# Patient Record
Sex: FEMALE | Race: BLACK OR AFRICAN AMERICAN | NOT HISPANIC OR LATINO | Employment: FULL TIME | URBAN - METROPOLITAN AREA
[De-identification: names, ages, dates, MRNs, and addresses within clinical notes are randomized per-mention and may not be internally consistent; named-entity substitution may affect disease eponyms.]

---

## 2022-07-01 ENCOUNTER — TELEPHONE (OUTPATIENT)
Dept: INTERNAL MEDICINE | Facility: CLINIC | Age: 52
End: 2022-07-01

## 2022-07-01 NOTE — TELEPHONE ENCOUNTER
Pt is complaining of sweating profusely all day, pt states it started a month ago and at first it was just at night but now its all day. Pt would like to schedule an appt to see eladia Guillermo.

## 2024-08-19 ENCOUNTER — TELEPHONE (OUTPATIENT)
Dept: GYNECOLOGY | Facility: CLINIC | Age: 54
End: 2024-08-19
Payer: COMMERCIAL

## 2024-08-19 NOTE — TELEPHONE ENCOUNTER
----- Message from Eugenia Almonte sent at 8/13/2024  2:02 PM CDT -----  Good afternoon Ms. Mondragon,    I put a note on the appointment (08/22/24) tab that patient needs cardiac risk stratification for procedure.  Will notify you when it is completed.    Thank you,  Alanna Alvarado LPN Ragas, Amanda; Eugenia Almonte  Caller: Unspecified (5 days ago,  2:46 PM)  She has an appt on 8/22/24.  Would that visit be able to provide clearance for GYN?        Previous Messages       ----- Message -----  From: Alanna Bell LPN  Sent: 8/13/2024   8:00 AM CDT  To: Alanna Bell LPN      ----- Message -----  From: Alanna Bell LPN  Sent: 8/8/2024   2:48 PM CDT  To: Alanna Bell LPN; Eugenia Almonte   Patient Calls  (Newest Message First)  View All Conversations on this Encounter   Alanna Bell LPN  You; Eugenia Almonte1 hour ago (11:34 AM)    ADALBERTO  She has an appt on 8/22/24.  Would that visit be able to provide clearance for GYN?     Alanna Bell LPN routed conversation to Alanna Bell LPN5 hours ago (8:00 AM)     Alanna Bell LPN routed conversation to Alanna Bell LPN5 hours ago (8:00 AM)    Alanna Bell LPN routed conversation to You; Alanna Bell LPN5 days ago    Alanna Bell LPN5 days ago    ADALBERTO Montilla    GYN requested a clearance some time back, but tests needed to be done.  I was just wanting to F/U to see if a clearance would be a possibility at this time.     Alanna Boyer

## 2024-08-20 ENCOUNTER — LAB VISIT (OUTPATIENT)
Dept: LAB | Facility: HOSPITAL | Age: 54
End: 2024-08-20
Attending: NURSE PRACTITIONER
Payer: COMMERCIAL

## 2024-08-20 DIAGNOSIS — E55.9 VITAMIN D DEFICIENCY: ICD-10-CM

## 2024-08-20 DIAGNOSIS — I10 PRIMARY HYPERTENSION: ICD-10-CM

## 2024-08-20 DIAGNOSIS — R73.03 PREDIABETES: ICD-10-CM

## 2024-08-20 LAB
25(OH)D3+25(OH)D2 SERPL-MCNC: 49 NG/ML (ref 30–80)
ALBUMIN SERPL-MCNC: 3.4 G/DL (ref 3.5–5)
ALBUMIN/GLOB SERPL: 0.8 RATIO (ref 1.1–2)
ALP SERPL-CCNC: 106 UNIT/L (ref 40–150)
ALT SERPL-CCNC: 13 UNIT/L (ref 0–55)
ANION GAP SERPL CALC-SCNC: 7 MEQ/L
AST SERPL-CCNC: 14 UNIT/L (ref 5–34)
BACTERIA #/AREA URNS AUTO: ABNORMAL /HPF
BASOPHILS # BLD AUTO: 0.03 X10(3)/MCL
BASOPHILS NFR BLD AUTO: 0.4 %
BILIRUB SERPL-MCNC: 0.3 MG/DL
BILIRUB UR QL STRIP.AUTO: NEGATIVE
BUN SERPL-MCNC: 14.1 MG/DL (ref 9.8–20.1)
CALCIUM SERPL-MCNC: 9.9 MG/DL (ref 8.4–10.2)
CHLORIDE SERPL-SCNC: 106 MMOL/L (ref 98–107)
CHOLEST SERPL-MCNC: 175 MG/DL
CHOLEST/HDLC SERPL: 4 {RATIO} (ref 0–5)
CLARITY UR: CLEAR
CO2 SERPL-SCNC: 28 MMOL/L (ref 22–29)
COLOR UR AUTO: YELLOW
CREAT SERPL-MCNC: 0.73 MG/DL (ref 0.55–1.02)
CREAT/UREA NIT SERPL: 19
EOSINOPHIL # BLD AUTO: 0.18 X10(3)/MCL (ref 0–0.9)
EOSINOPHIL NFR BLD AUTO: 2.6 %
ERYTHROCYTE [DISTWIDTH] IN BLOOD BY AUTOMATED COUNT: 15.9 % (ref 11.5–17)
EST. AVERAGE GLUCOSE BLD GHB EST-MCNC: 125.5 MG/DL
GFR SERPLBLD CREATININE-BSD FMLA CKD-EPI: >60 ML/MIN/1.73/M2
GLOBULIN SER-MCNC: 4.2 GM/DL (ref 2.4–3.5)
GLUCOSE SERPL-MCNC: 99 MG/DL (ref 74–100)
GLUCOSE UR QL STRIP: NORMAL
HBA1C MFR BLD: 6 %
HCT VFR BLD AUTO: 40.2 % (ref 37–47)
HDLC SERPL-MCNC: 46 MG/DL (ref 35–60)
HGB BLD-MCNC: 12.3 G/DL (ref 12–16)
HGB UR QL STRIP: ABNORMAL
HYALINE CASTS #/AREA URNS LPF: ABNORMAL /LPF
IMM GRANULOCYTES # BLD AUTO: 0.02 X10(3)/MCL (ref 0–0.04)
IMM GRANULOCYTES NFR BLD AUTO: 0.3 %
KETONES UR QL STRIP: NEGATIVE
LDLC SERPL CALC-MCNC: 111 MG/DL (ref 50–140)
LEUKOCYTE ESTERASE UR QL STRIP: NEGATIVE
LYMPHOCYTES # BLD AUTO: 2.41 X10(3)/MCL (ref 0.6–4.6)
LYMPHOCYTES NFR BLD AUTO: 34.2 %
MCH RBC QN AUTO: 22.8 PG (ref 27–31)
MCHC RBC AUTO-ENTMCNC: 30.6 G/DL (ref 33–36)
MCV RBC AUTO: 74.4 FL (ref 80–94)
MONOCYTES # BLD AUTO: 0.59 X10(3)/MCL (ref 0.1–1.3)
MONOCYTES NFR BLD AUTO: 8.4 %
MUCOUS THREADS URNS QL MICRO: ABNORMAL /LPF
NEUTROPHILS # BLD AUTO: 3.82 X10(3)/MCL (ref 2.1–9.2)
NEUTROPHILS NFR BLD AUTO: 54.1 %
NITRITE UR QL STRIP: NEGATIVE
NRBC BLD AUTO-RTO: 0 %
PH UR STRIP: 5.5 [PH]
PLATELET # BLD AUTO: 246 X10(3)/MCL (ref 130–400)
PMV BLD AUTO: 12.6 FL (ref 7.4–10.4)
POTASSIUM SERPL-SCNC: 3.7 MMOL/L (ref 3.5–5.1)
PROT SERPL-MCNC: 7.6 GM/DL (ref 6.4–8.3)
PROT UR QL STRIP: ABNORMAL
RBC # BLD AUTO: 5.4 X10(6)/MCL (ref 4.2–5.4)
RBC #/AREA URNS AUTO: ABNORMAL /HPF
SODIUM SERPL-SCNC: 141 MMOL/L (ref 136–145)
SP GR UR STRIP.AUTO: 1.03 (ref 1–1.03)
SQUAMOUS #/AREA URNS LPF: ABNORMAL /HPF
TRIGL SERPL-MCNC: 91 MG/DL (ref 37–140)
UROBILINOGEN UR STRIP-ACNC: ABNORMAL
VLDLC SERPL CALC-MCNC: 18 MG/DL
WBC # BLD AUTO: 7.05 X10(3)/MCL (ref 4.5–11.5)
WBC #/AREA URNS AUTO: ABNORMAL /HPF

## 2024-08-20 PROCEDURE — 80053 COMPREHEN METABOLIC PANEL: CPT

## 2024-08-20 PROCEDURE — 81015 MICROSCOPIC EXAM OF URINE: CPT

## 2024-08-20 PROCEDURE — 82306 VITAMIN D 25 HYDROXY: CPT

## 2024-08-20 PROCEDURE — 80061 LIPID PANEL: CPT

## 2024-08-20 PROCEDURE — 36415 COLL VENOUS BLD VENIPUNCTURE: CPT

## 2024-08-20 PROCEDURE — 85025 COMPLETE CBC W/AUTO DIFF WBC: CPT

## 2024-08-20 PROCEDURE — 83036 HEMOGLOBIN GLYCOSYLATED A1C: CPT

## 2024-08-20 PROCEDURE — 81001 URINALYSIS AUTO W/SCOPE: CPT

## 2024-08-21 ENCOUNTER — OFFICE VISIT (OUTPATIENT)
Dept: INTERNAL MEDICINE | Facility: CLINIC | Age: 54
End: 2024-08-21
Payer: COMMERCIAL

## 2024-08-21 VITALS
HEIGHT: 64 IN | SYSTOLIC BLOOD PRESSURE: 117 MMHG | WEIGHT: 274 LBS | RESPIRATION RATE: 16 BRPM | BODY MASS INDEX: 46.78 KG/M2 | HEART RATE: 93 BPM | DIASTOLIC BLOOD PRESSURE: 66 MMHG | TEMPERATURE: 98 F

## 2024-08-21 DIAGNOSIS — R73.03 PREDIABETES: ICD-10-CM

## 2024-08-21 DIAGNOSIS — R94.2 RESTRICTIVE PATTERN PRESENT ON PULMONARY FUNCTION TESTING: ICD-10-CM

## 2024-08-21 DIAGNOSIS — I10 PRIMARY HYPERTENSION: ICD-10-CM

## 2024-08-21 DIAGNOSIS — E66.01 OBESITY, CLASS III, BMI 40-49.9 (MORBID OBESITY): ICD-10-CM

## 2024-08-21 DIAGNOSIS — R06.02 SOB (SHORTNESS OF BREATH): ICD-10-CM

## 2024-08-21 PROCEDURE — 99215 OFFICE O/P EST HI 40 MIN: CPT | Mod: PBBFAC | Performed by: NURSE PRACTITIONER

## 2024-08-21 PROCEDURE — 99214 OFFICE O/P EST MOD 30 MIN: CPT | Mod: S$PBB,,, | Performed by: NURSE PRACTITIONER

## 2024-08-21 RX ORDER — MECLIZINE HYDROCHLORIDE 25 MG/1
25 TABLET ORAL 3 TIMES DAILY PRN
Qty: 30 TABLET | Refills: 4 | Status: SHIPPED | OUTPATIENT
Start: 2024-08-21

## 2024-08-21 RX ORDER — SEMAGLUTIDE 0.25 MG/.5ML
0.25 INJECTION, SOLUTION SUBCUTANEOUS
Qty: 2 ML | Refills: 3 | Status: SHIPPED | OUTPATIENT
Start: 2024-08-21

## 2024-08-21 RX ORDER — VALACYCLOVIR HYDROCHLORIDE 500 MG/1
500 TABLET, FILM COATED ORAL DAILY
Qty: 90 TABLET | Refills: 1 | Status: SHIPPED | OUTPATIENT
Start: 2024-08-21

## 2024-08-21 RX ORDER — OMEPRAZOLE 40 MG/1
40 CAPSULE, DELAYED RELEASE ORAL DAILY
Qty: 90 CAPSULE | Refills: 1 | Status: SHIPPED | OUTPATIENT
Start: 2024-08-21

## 2024-08-21 RX ORDER — CLONIDINE HYDROCHLORIDE 0.2 MG/1
0.2 TABLET ORAL NIGHTLY
Qty: 30 TABLET | Refills: 6 | Status: SHIPPED | OUTPATIENT
Start: 2024-08-21 | End: 2025-08-21

## 2024-08-21 RX ORDER — ALBUTEROL SULFATE 90 UG/1
2 INHALANT RESPIRATORY (INHALATION) EVERY 6 HOURS PRN
Qty: 8 G | Refills: 1 | Status: SHIPPED | OUTPATIENT
Start: 2024-08-21

## 2024-08-21 RX ORDER — IPRATROPIUM BROMIDE AND ALBUTEROL SULFATE 2.5; .5 MG/3ML; MG/3ML
3 SOLUTION RESPIRATORY (INHALATION) EVERY 6 HOURS PRN
Qty: 75 ML | Refills: 2 | Status: SHIPPED | OUTPATIENT
Start: 2024-08-21 | End: 2025-08-21

## 2024-08-21 NOTE — PROGRESS NOTES
[facility]  MARY Cunha     Patient Name: Saeid Cheema   : 1970  MRN:[id number]     Chief Complaint     Chief Complaint   Patient presents with    Hypertension     Lab review        History of Present Illness     53 year old AAF, presents in clinic for lab results. No acute complaints. Requests Wegovy injections for weight loss. BMI 47. A1C 6.0, prediabetic.      PMH HTN, HLD, prediabetes, CAD, HFrEF, left breast cancer 10/7/2022, left lower lobe lung nodule, GERD, migraines, genital herpes, IC, hematuria, chlamydia pneumonia 3/2022, BV. Nonsmoker. PMH TIA, and small cerebral aneurysm in . 2021; MR angio head and MRI brain without acute findings. EEG 2021; normal.   Following Dr. Shankar [facility]O for migraines. He is trying to get her approved for BOTOX injections for migraine prevention. Followed by [facility] and [facility] for further workup of hematuria; cystoscopy 2020 displayed Microscopic hematuria, Squamous metaplasia. Status post bilateral mastectomy 2022 [facility] with the large open wound in the right chest. Seroma in the region of the left lateral chest wall. Wound are at [facility] per Dr. Rowley. [facility] G[facility] and [facility].   She is on valacyclovir 500mg daily for genital herpes-suppression; voices 3 outbreaks per yr, in which she doubles her med for 5 days during an outbreak.   PFT 8/10/2022 moderate restriction, no bronchodilator response. Normal lung volumes. Although she is requesting refill on albuterol as she states she feels it helps her breathing.  PFT 10/26/23 WNL       States her  passed away of cardiac complications 2022. Lives with 15 year old grandson.   Denies CP, SOB, cough, fever, dizziness, weakness, abdominal pain, nausea, vomiting, diarrhea, constipation, depression, SI,  "HI.  ----------------------------------------------------------------------------------------------------------------------------------------------------------------------------------   Works at [employer]  Colonoscopy with polyps, Dr. Richardson 2021; told she needed to repeat scope but states she could not afford the repeat scope.  CT Chest 10/3/2023 Stable 5 mm left lower lobe nodule and 3 mm right middle lobe fissural nodule.No new or enlarging pulmonary nodule.  Recommend continued follow-up low-dose chest CT in 1 year.  She has FMLA intermittent leave paperwork for h/o migraines.                      Review of Systems     Review of Systems   Constitutional: Negative.    HENT: Negative.     Eyes: Negative.    Respiratory:  Positive for shortness of breath.         DUARTE   Cardiovascular: Negative.    Gastrointestinal: Negative.    Endocrine: Negative.    Genitourinary: Negative.    Musculoskeletal: Negative.    Integumentary:  Negative.   Allergic/Immunologic: Negative.    Neurological: Negative.    Hematological: Negative.    Psychiatric/Behavioral: Negative.     All other systems reviewed and are negative.       Physical Examination     Visit Vitals  /66 (BP Location: Left arm, Patient Position: Sitting, BP Method: Large (Automatic))   Pulse 93   Temp 98.1 °F (36.7 °C) (Oral)   Resp 16   Ht 5' 4" (1.626 m)   Wt 124.3 kg (274 lb)   BMI 47.03 kg/m²        BP Readings from Last 6 Encounters:   08/21/24 117/66   07/29/24 (!) 150/81   07/09/24 119/71   06/26/24 123/82   06/04/24 (!) 142/67   05/15/24 (!) 157/95   ]    Wt Readings from Last 6 Encounters:   08/21/24 124.3 kg (274 lb)   07/29/24 120.2 kg (265 lb)   07/09/24 121.3 kg (267 lb 6.4 oz)   06/26/24 118.8 kg (261 lb 14.5 oz)   06/04/24 118.8 kg (262 lb)   05/15/24 119.7 kg (264 lb)   ]    BMI Readings from Last 3 Encounters:   08/21/24 47.03 kg/m²   07/29/24 45.49 kg/m²   07/09/24 45.90 kg/m²         Physical Exam  Vitals and nursing note " reviewed.   Constitutional:       Appearance: Normal appearance. She is obese.   HENT:      Head: Normocephalic and atraumatic.      Right Ear: Tympanic membrane, ear canal and external ear normal.      Left Ear: Tympanic membrane, ear canal and external ear normal.      Nose: Nose normal.      Mouth/Throat:      Mouth: Mucous membranes are moist.      Pharynx: Oropharynx is clear.   Eyes:      Extraocular Movements: Extraocular movements intact.      Conjunctiva/sclera: Conjunctivae normal.      Pupils: Pupils are equal, round, and reactive to light.   Cardiovascular:      Rate and Rhythm: Normal rate and regular rhythm.      Pulses: Normal pulses.      Heart sounds: Normal heart sounds.   Pulmonary:      Effort: Pulmonary effort is normal.      Breath sounds: Normal breath sounds.   Abdominal:      General: Abdomen is flat. Bowel sounds are normal.      Palpations: Abdomen is soft.   Musculoskeletal:         General: Normal range of motion.      Cervical back: Normal range of motion and neck supple.   Skin:     General: Skin is warm and dry.      Capillary Refill: Capillary refill takes less than 2 seconds.   Neurological:      General: No focal deficit present.      Mental Status: She is alert and oriented to person, place, and time. Mental status is at baseline.   Psychiatric:         Mood and Affect: Mood normal.         Behavior: Behavior normal.         Thought Content: Thought content normal.         Judgment: Judgment normal.          Labs / Imaging     Chemistry:  Lab Results   Component Value Date     08/20/2024     11/23/2022    K 3.7 08/20/2024    K 3.8 11/23/2022    BUN 14.1 08/20/2024    BUN 10 11/23/2022    CREATININE 0.73 08/20/2024    CREATININE 0.65 11/23/2022    EGFRNORACEVR >60 08/20/2024    GLUCOSE 99 08/20/2024    CALCIUM 9.9 08/20/2024    CALCIUM 9.5 11/23/2022    ALKPHOS 106 08/20/2024    LABPROT 7.6 08/20/2024    ALBUMIN 3.4 (L) 08/20/2024    BILIDIR 0.1 07/13/2022    IBILI 0.10  01/25/2022    AST 14 08/20/2024    ALT 13 08/20/2024    MG 1.90 06/04/2024    FVCPYVML54OI 49 08/20/2024        Lab Results   Component Value Date    HGBA1C 6.0 08/20/2024        Hematology:  Lab Results   Component Value Date    WBC 7.05 08/20/2024    RBC 5.40 08/20/2024    HGB 12.3 08/20/2024    HCT 40.2 08/20/2024    MCV 74.4 (L) 08/20/2024    MCH 22.8 (L) 08/20/2024    MCHC 30.6 (L) 08/20/2024    RDW 15.9 08/20/2024     08/20/2024    MPV 12.6 (H) 08/20/2024        Lipid Panel:  Lab Results   Component Value Date    CHOL 175 08/20/2024    HDL 46 08/20/2024    .00 08/20/2024    TRIG 91 08/20/2024    TOTALCHOLEST 4 08/20/2024        Urine:  Lab Results   Component Value Date    APPEARANCEUA Clear 08/20/2024    SGUA 1.029 08/20/2024    PROTEINUA Trace (A) 08/20/2024    KETONESUA Negative 08/20/2024    LEUKOCYTESUR Negative 08/20/2024    RBCUA 0-5 08/20/2024    WBCUA 0-5 08/20/2024    BACTERIA Trace (A) 08/20/2024    SQEPUA Moderate (A) 08/20/2024    HYALINECASTS 0-2 (A) 08/20/2024    CREATRANDUR 222.0 02/19/2018          Assessment       ICD-10-CM ICD-9-CM   1. Primary hypertension  I10 401.9   2. Restrictive pattern present on pulmonary function testing  R94.2 794.2   3. SOB (shortness of breath)  R06.02 786.05   4. Prediabetes  R73.03 790.29   5. Obesity, Class III, BMI 40-49.9 (morbid obesity)  E66.01 278.01        Plan   1. Primary hypertension  BP and HR stable. Med refills. DASH diet: Eat more fruits, vegetables, and low fat dairy foods.  (Less than 2 grams of sodium per day).  Maintain healthy weight with goal BMI <30.   Exercise 30 minutes per day 5 days per week.  Home medications refilled and continued.   Home BP monitoring encouraged with BP parameters given.      2. Restrictive pattern present on pulmonary function testing    - albuterol-ipratropium (DUO-NEB) 2.5 mg-0.5 mg/3 mL nebulizer solution; Take 3 mLs by nebulization every 6 (six) hours as needed for Wheezing. Rescue  Dispense: 75  mL; Refill: 2  - CT Chest Without Contrast; Future    3. SOB (shortness of breath)    - albuterol (PROVENTIL/VENTOLIN HFA) 90 mcg/actuation inhaler; Inhale 2 puffs into the lungs every 6 (six) hours as needed for Wheezing. Rescue  Dispense: 8 g; Refill: 1  - CT Chest Without Contrast; Future    4. Prediabetes  A1C 6.0  Prediabetes is reversible. Close follow up is important.  Maintain healthy weight or lose weight.   Eat fewer refined carbohydrates and fats, and more fiber.  Read nutrition labels.  Reduce portion sizes.  Eat out less often. Avoid fast foods.  Drink water and unsweetened beverages.  Spending at least one hour every day in physical activity.   - semaglutide, weight loss, (WEGOVY) 0.25 mg/0.5 mL PnIj; Inject 0.25 mg into the skin every 7 days.  Dispense: 2 mL; Refill: 3  - Comprehensive Metabolic Panel; Future  - Hemoglobin A1C; Future    5. Obesity, Class III, BMI 40-49.9 (morbid obesity)  RX Wegovy 0.25mg once weekly,   F/u 4 -6 wks  Goal BMI <30.  Exercise 5 times a week for 30 minutes per day.  Avoid soda, simple sugars, excessive rice, potatoes or bread. Limit fast foods and fried foods.  Choose complex carbs in moderation (example: green vegetables, beans, oatmeal). Eat plenty of fresh fruits and vegetables with lean meats daily.  Do not skip meals. Eat a balanced portion size.  Avoid fad diets. Consider permanent healthy life style changes.     - semaglutide, weight loss, (WEGOVY) 0.25 mg/0.5 mL PnIj; Inject 0.25 mg into the skin every 7 days.  Dispense: 2 mL; Refill: 3  - Comprehensive Metabolic Panel; Future  - Hemoglobin A1C; Future        Current Outpatient Medications   Medication Instructions    albuterol (PROVENTIL/VENTOLIN HFA) 90 mcg/actuation inhaler 2 puffs, Inhalation, Every 6 hours PRN, Rescue    albuterol-ipratropium (DUO-NEB) 2.5 mg-0.5 mg/3 mL nebulizer solution 3 mLs, Nebulization, Every 6 hours PRN, Rescue    ascorbic acid (vitamin C) (VITAMIN C) 1,000 mg, Oral, Daily     cholecalciferol (vitamin D3) 50,000 Units, Oral, Every 7 days    cloNIDine (CATAPRES) 0.2 mg, Oral, Nightly    furosemide (LASIX) 40 mg, Oral, Daily    meclizine (ANTIVERT) 25 mg, Oral, 3 times daily PRN    metoprolol succinate (TOPROL-XL) 25 mg, Oral, Daily    nitroGLYCERIN (NITROSTAT) 0.4 mg, Sublingual, Every 5 min PRN    omeprazole (PRILOSEC) 40 mg, Oral, Daily    ondansetron (ZOFRAN) 8 mg, Oral, Every 12 hours PRN    QULIPTA 60 mg, Oral, Daily    sacubitriL-valsartan (ENTRESTO) 49-51 mg per tablet 1 tablet, Oral, 2 times daily    valACYclovir (VALTREX) 500 mg, Oral, Daily    WEGOVY 0.25 mg, Subcutaneous, Every 7 days       Orders Placed This Encounter   Procedures    CT Chest Without Contrast    Comprehensive Metabolic Panel    Hemoglobin A1C         Future Appointments   Date Time Provider Department Center   8/22/2024  2:00 PM [facility]    8/28/2024  8:30 AM [facility]    10/21/2024  1:40 PM [facility]    10/21/2024  3:00 PM [facility]         Follow up in about 2 months (around 10/21/2024) for lab review.    Labs thoroughly reviewed with patient. Medication refills addressed today.  RTC prn and 2 months, with labs 1 week prior to the apt.  COVID 19 precautions given to patient.  Patient voices understanding of all discharge instructions.      MARY Cunha

## 2024-08-28 ENCOUNTER — LAB VISIT (OUTPATIENT)
Dept: LAB | Facility: HOSPITAL | Age: 54
End: 2024-08-28
Attending: NURSE PRACTITIONER
Payer: COMMERCIAL

## 2024-08-28 ENCOUNTER — TELEPHONE (OUTPATIENT)
Dept: INTERNAL MEDICINE | Facility: CLINIC | Age: 54
End: 2024-08-28
Payer: COMMERCIAL

## 2024-08-28 ENCOUNTER — OFFICE VISIT (OUTPATIENT)
Dept: GYNECOLOGY | Facility: CLINIC | Age: 54
End: 2024-08-28
Payer: COMMERCIAL

## 2024-08-28 VITALS
HEIGHT: 64 IN | HEART RATE: 78 BPM | RESPIRATION RATE: 18 BRPM | OXYGEN SATURATION: 99 % | SYSTOLIC BLOOD PRESSURE: 130 MMHG | DIASTOLIC BLOOD PRESSURE: 79 MMHG | WEIGHT: 274.38 LBS | TEMPERATURE: 99 F | BODY MASS INDEX: 46.84 KG/M2

## 2024-08-28 DIAGNOSIS — Z11.3 ROUTINE SCREENING FOR STI (SEXUALLY TRANSMITTED INFECTION): ICD-10-CM

## 2024-08-28 DIAGNOSIS — Z12.11 COLON CANCER SCREENING: Primary | ICD-10-CM

## 2024-08-28 DIAGNOSIS — R73.03 PREDIABETES: ICD-10-CM

## 2024-08-28 DIAGNOSIS — Z12.11 COLON CANCER SCREENING: ICD-10-CM

## 2024-08-28 DIAGNOSIS — Z01.419 WOMEN'S ANNUAL ROUTINE GYNECOLOGICAL EXAMINATION: Primary | ICD-10-CM

## 2024-08-28 DIAGNOSIS — E66.01 OBESITY, CLASS III, BMI 40-49.9 (MORBID OBESITY): ICD-10-CM

## 2024-08-28 LAB
ALBUMIN SERPL-MCNC: 3.4 G/DL (ref 3.5–5)
ALBUMIN/GLOB SERPL: 0.9 RATIO (ref 1.1–2)
ALP SERPL-CCNC: 96 UNIT/L (ref 40–150)
ALT SERPL-CCNC: 12 UNIT/L (ref 0–55)
ANION GAP SERPL CALC-SCNC: 6 MEQ/L
AST SERPL-CCNC: 15 UNIT/L (ref 5–34)
BILIRUB SERPL-MCNC: 0.3 MG/DL
BUN SERPL-MCNC: 12.1 MG/DL (ref 9.8–20.1)
C TRACH DNA SPEC QL NAA+PROBE: NOT DETECTED
CALCIUM SERPL-MCNC: 9.6 MG/DL (ref 8.4–10.2)
CHLORIDE SERPL-SCNC: 108 MMOL/L (ref 98–107)
CLUE CELLS VAG QL WET PREP: ABNORMAL
CO2 SERPL-SCNC: 27 MMOL/L (ref 22–29)
CREAT SERPL-MCNC: 0.66 MG/DL (ref 0.55–1.02)
CREAT/UREA NIT SERPL: 18
EST. AVERAGE GLUCOSE BLD GHB EST-MCNC: 122.6 MG/DL
GFR SERPLBLD CREATININE-BSD FMLA CKD-EPI: >60 ML/MIN/1.73/M2
GLOBULIN SER-MCNC: 3.8 GM/DL (ref 2.4–3.5)
GLUCOSE SERPL-MCNC: 59 MG/DL (ref 74–100)
HBA1C MFR BLD: 5.9 %
HBV SURFACE AG SERPL QL IA: NONREACTIVE
HCV AB SERPL QL IA: NONREACTIVE
HIV 1+2 AB+HIV1 P24 AG SERPL QL IA: NONREACTIVE
N GONORRHOEA DNA SPEC QL NAA+PROBE: NOT DETECTED
POTASSIUM SERPL-SCNC: 3.7 MMOL/L (ref 3.5–5.1)
PROT SERPL-MCNC: 7.2 GM/DL (ref 6.4–8.3)
SODIUM SERPL-SCNC: 141 MMOL/L (ref 136–145)
SOURCE (OHS): NORMAL
T PALLIDUM AB SER QL: NONREACTIVE
T VAGINALIS VAG QL WET PREP: ABNORMAL
WBC #/AREA VAG WET PREP: ABNORMAL
YEAST SPEC QL WET PREP: ABNORMAL

## 2024-08-28 PROCEDURE — 86780 TREPONEMA PALLIDUM: CPT

## 2024-08-28 PROCEDURE — 87591 N.GONORRHOEAE DNA AMP PROB: CPT | Performed by: NURSE PRACTITIONER

## 2024-08-28 PROCEDURE — 86803 HEPATITIS C AB TEST: CPT

## 2024-08-28 PROCEDURE — 87389 HIV-1 AG W/HIV-1&-2 AB AG IA: CPT

## 2024-08-28 PROCEDURE — 99396 PREV VISIT EST AGE 40-64: CPT | Mod: S$PBB,,, | Performed by: NURSE PRACTITIONER

## 2024-08-28 PROCEDURE — 87491 CHLMYD TRACH DNA AMP PROBE: CPT | Performed by: NURSE PRACTITIONER

## 2024-08-28 PROCEDURE — 80053 COMPREHEN METABOLIC PANEL: CPT

## 2024-08-28 PROCEDURE — 87210 SMEAR WET MOUNT SALINE/INK: CPT | Performed by: NURSE PRACTITIONER

## 2024-08-28 PROCEDURE — 99215 OFFICE O/P EST HI 40 MIN: CPT | Mod: PBBFAC | Performed by: NURSE PRACTITIONER

## 2024-08-28 PROCEDURE — 36415 COLL VENOUS BLD VENIPUNCTURE: CPT

## 2024-08-28 PROCEDURE — 83036 HEMOGLOBIN GLYCOSYLATED A1C: CPT

## 2024-08-28 PROCEDURE — 87340 HEPATITIS B SURFACE AG IA: CPT

## 2024-08-28 RX ORDER — POLYETHYLENE GLYCOL 3350, SODIUM SULFATE, SODIUM CHLORIDE, POTASSIUM CHLORIDE, SODIUM ASCORBATE, AND ASCORBIC ACID 7.5-2.691G
KIT ORAL
Qty: 1 KIT | Refills: 0 | Status: SHIPPED | OUTPATIENT
Start: 2024-08-28

## 2024-08-28 NOTE — LETTER
August 28, 2024      [facility]  [address] [address]  Phone: [phone]       Patient: Saeid Cheema   YOB: 1970  Date of Visit: 08/28/2024    To Whom It May Concern:    Shyam Cheema  was at [facility] on 08/28/2024. The patient may return to work/school on 08/28/2024 with no restrictions. If you have any questions or concerns, or if I can be of further assistance, please do not hesitate to contact me.    Sincerely,    MARY Sheppard

## 2024-08-28 NOTE — PROGRESS NOTES
"Patient ID: Saeid Cheema is a 54 y.o. female.    Chief Complaint: Gynecologic Exam      Review of patient's allergies indicates:   Allergen Reactions    Codeine Shortness Of Breath     Pt denies codeine allergy    Shellfish derived Rash     States eats crawfish               HPI:  The patient  (SABx3) here for annual gyn exam. Denies history of abnormal pap smear. Last pap -NIL; HPV 16/18/45 negative. She is s/p supracervical hysterectomy in  for AUB. Denies vaginal discharge. Denies urinary complaints. States had colonoscopy with Dr. Richardson in  with plan to repeat in 3 years d/t polyps. Pt is requesting to get referred here.   Fly hx includes maternal and paternal gm with breast cancer and paternal GM with hx of ovarian cancer. Pt was diagnosed with left breast cancer in  and underwent bilateral mastectomy/chemotherapy.. She is followed by Dr. Herron at [facility]. Pt also has BRCA 2 mutation and is following gyn resident team for risk reducing BSO. She is apparently awaiting cardiac clearance. Per appt  hx, pt no showed for her appt.  Pt has no complaints. Requesting STI screening      Review of Systems:   Negative except for findings in HPI     Objective:   /79   Pulse 78   Temp 98.6 °F (37 °C) (Oral)   Resp 18   Ht 5' 4" (1.626 m)   Wt 124.5 kg (274 lb 6.4 oz)   SpO2 99%   BMI 47.10 kg/m²    Physical Exam:  GENERAL: Pt is aware and alert and  in no acute distress.  BREASTS: Bilateral-mastectomy.  ABDOMEN: Soft, non tender.  VULVA:  No lesions or skin changes.  URETHRA: No lesions  BLADDER: No tenderness.  VAGINA: Mucosa normal,white discharge; no lesions.  CERVIX:  no CMT, NO discharge; NO lesions  BIMANUAL EXAM: exam limited d/t body habitus. The uterus is absent. Efrain adnexa reveal no evidence of masses; no fullness   SKIN: Warm and Dry  PSYCHIATRIC: Patient is awake and alert. Mood and affect are normal.    Assessment:   Women's annual routine gynecological " examination    Routine screening for STI (sexually transmitted infection)  -     Wet Prep, Genital  -     SYPHILIS ANTIBODY (WITH REFLEX RPR); Future; Expected date: 08/28/2024  -     HIV 1/2 Ag/Ab (4th Gen); Future; Expected date: 08/28/2024  -     Hepatitis C Antibody; Future; Expected date: 08/28/2024  -     Hepatitis B Surface Antigen; Future; Expected date: 08/28/2024  -     Chlamydia/GC, PCR    Colon cancer screening  -     Ambulatory referral/consult to Endo Procedure ; Future; Expected date: 11/28/2024            1. Women's annual routine gynecological examination    2. Routine screening for STI (sexually transmitted infection)    3. Colon cancer screening             -pap UTD  -encouraged pt to reschedule cardiology appt for clearance  Plan:       Follow up in about 1 year (around 8/28/2025).

## 2024-09-03 ENCOUNTER — OFFICE VISIT (OUTPATIENT)
Dept: CARDIOLOGY | Facility: CLINIC | Age: 54
End: 2024-09-03
Payer: COMMERCIAL

## 2024-09-03 VITALS
OXYGEN SATURATION: 100 % | HEIGHT: 64 IN | SYSTOLIC BLOOD PRESSURE: 152 MMHG | RESPIRATION RATE: 20 BRPM | DIASTOLIC BLOOD PRESSURE: 82 MMHG | TEMPERATURE: 99 F | HEART RATE: 86 BPM | WEIGHT: 273.13 LBS | BODY MASS INDEX: 46.63 KG/M2

## 2024-09-03 DIAGNOSIS — I50.20 ACC/AHA STAGE C HEART FAILURE WITH REDUCED EJECTION FRACTION: Primary | ICD-10-CM

## 2024-09-03 PROCEDURE — 99215 OFFICE O/P EST HI 40 MIN: CPT | Mod: PBBFAC | Performed by: INTERNAL MEDICINE

## 2024-09-03 RX ORDER — SPIRONOLACTONE 25 MG/1
25 TABLET ORAL DAILY
Qty: 30 TABLET | Refills: 11 | Status: SHIPPED | OUTPATIENT
Start: 2024-09-03 | End: 2025-09-03

## 2024-09-03 NOTE — PROGRESS NOTES
09/03/2024 2:07 PM    Subjective:     CHIEF COMPLAINT:  Dyspnea on exertion                        HPI:    Ms. Saeid Cheema is a 54 y.o. female with past medical history of HTN, HLD, prediabetes, CAD, left breast cancer 10/7/2022, left lower lobe lung nodule, GERD, and migraines who presents to Cardiology Clinic for follow-up of dyspnea on exertion.      The patient underwent LHC showing normal coronaries. She stopped jardiance for a UTI. She is NYHA class 2. No CP or syncope. Edma controlled with lasix. Needs hysterectomy.     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    Pelvic pain    ACC/AHA stage C heart failure with reduced ejection fraction   Surgical History    Past Surgical History:   Procedure Laterality Date    ANGIOGRAM, CORONARY, WITH LEFT HEART CATHETERIZATION N/A 07/29/2024    Procedure: Angiogram, Coronary, with Left Heart Cath;  Surgeon: Pankaj Singh MD;  Location: [facility] CATH LAB;  Service: Cardiology;  Laterality: N/A;    BREAST SURGERY  11/30/2022    COMPUTED TOMOGRAPHY ANGIOGRAM  09/07/2012    HYSTERECTOMY  07/13/2013    MASTECTOMY Bilateral 11/30/2022    PARTIAL HYSTERECTOMY     Social History    Social History     Socioeconomic History    Marital status:     Number of children: 5   Occupational History    Occupation: Employed   Tobacco Use    Smoking status: Never     Passive exposure: Never    Smokeless tobacco: Never   Substance and Sexual Activity    Alcohol use: Not Currently     Comment: Occasionally    Drug use: Never    Sexual activity: Not Currently     Partners: Male     Birth control/protection: Post-menopausal     Social Determinants of Health     Financial Resource Strain: Medium Risk (8/20/2024)    Overall  Financial Resource Strain (CARDIA)     Difficulty of Paying Living Expenses: Somewhat hard   Food Insecurity: Food Insecurity Present (8/20/2024)    Hunger Vital Sign     Worried About Running Out of Food in the Last Year: Sometimes true     Ran Out of Food in the Last Year: Sometimes true   Transportation Needs: No Transportation Needs (4/15/2024)    PRAPARE - Transportation     Lack of Transportation (Medical): No     Lack of Transportation (Non-Medical): No   Physical Activity: Insufficiently Active (8/20/2024)    Exercise Vital Sign     Days of Exercise per Week: 1 day     Minutes of Exercise per Session: 20 min   Stress: Stress Concern Present (8/20/2024)    [employer] - Occupational Stress Questionnaire     Feeling of Stress : Very much   Housing Stability: High Risk (8/20/2024)    Housing Stability Vital Sign     Unable to Pay for Housing in the Last Year: Yes     Homeless in the Last Year: No   Family History    Family History   Problem Relation Name Age of Onset    Breast cancer Paternal Grandmother      Ovarian cancer Maternal Grandmother      Breast cancer Maternal Grandmother      Hypertension Father Pankaj Malhotra     Heart disease Father Pankaj Malhotra     Hypertension Mother Carine Velazquez     Heart disease Mother Carine Velazquez     Asthma Mother Carine Velazquez     Cancer Mother Carine Velazquez    Allergy    Review of patient's allergies indicates:   Allergen Reactions    Codeine Shortness Of Breath     Pt denies codeine allergy    Shellfish derived Rash     States eats crawfish   Current Medications    Current Outpatient Medications:     albuterol (PROVENTIL/VENTOLIN HFA) 90 mcg/actuation inhaler, Inhale 2 puffs into the lungs every 6 (six) hours as needed for Wheezing. Rescue, Disp: 8 g, Rfl: 1    albuterol-ipratropium (DUO-NEB) 2.5 mg-0.5 mg/3 mL nebulizer solution, Take 3 mLs by nebulization every 6 (six) hours as needed for Wheezing. Rescue, Disp: 75 mL, Rfl: 2    ascorbic  acid, vitamin C, (VITAMIN C) 1000 MG tablet, Take 1,000 mg by mouth once daily., Disp: , Rfl:     atogepant (QULIPTA) 60 mg Tab, Take 60 mg by mouth once daily., Disp: 30 tablet, Rfl: 11    cloNIDine (CATAPRES) 0.2 MG tablet, Take 1 tablet (0.2 mg total) by mouth every evening., Disp: 30 tablet, Rfl: 6    furosemide (LASIX) 40 MG tablet, Take 1 tablet (40 mg total) by mouth once daily., Disp: 90 tablet, Rfl: 3    meclizine (ANTIVERT) 25 mg tablet, Take 1 tablet (25 mg total) by mouth 3 (three) times daily as needed for Dizziness., Disp: 30 tablet, Rfl: 4    metoprolol succinate (TOPROL-XL) 25 MG 24 hr tablet, Take 1 tablet (25 mg total) by mouth once daily., Disp: 90 tablet, Rfl: 3    nitroGLYCERIN (NITROSTAT) 0.4 MG SL tablet, Place 1 tablet (0.4 mg total) under the tongue every 5 (five) minutes as needed for Chest pain., Disp: 30 tablet, Rfl: 1    omeprazole (PRILOSEC) 40 MG capsule, Take 1 capsule (40 mg total) by mouth once daily., Disp: 90 capsule, Rfl: 1    ondansetron (ZOFRAN) 4 MG tablet, Take 2 tablets (8 mg total) by mouth every 12 (twelve) hours as needed for Nausea., Disp: 20 tablet, Rfl: 0    sacubitriL-valsartan (ENTRESTO) 49-51 mg per tablet, Take 1 tablet by mouth 2 (two) times daily., Disp: 180 tablet, Rfl: 3    semaglutide, weight loss, (WEGOVY) 0.25 mg/0.5 mL PnIj, Inject 0.25 mg into the skin every 7 days., Disp: 2 mL, Rfl: 3    valACYclovir (VALTREX) 500 MG tablet, Take 1 tablet (500 mg total) by mouth once daily., Disp: 90 tablet, Rfl: 1    cholecalciferol, vitamin D3, 1,250 mcg (50,000 unit) capsule, Take 1 capsule (50,000 Units total) by mouth every 7 days. (Patient not taking: Reported on 9/3/2024), Disp: 12 capsule, Rfl: 0    empagliflozin (JARDIANCE) 10 mg tablet, Take 1 tablet (10 mg total) by mouth once daily., Disp: 90 tablet, Rfl: 3    polyethylene glycol (MOVIPREP) 100-7.5-2.691 gram solution, Take as directed prior to colonoscopy (Patient not taking: Reported on 9/3/2024), Disp: 1  "kit, Rfl: 0    spironolactone (ALDACTONE) 25 MG tablet, Take 1 tablet (25 mg total) by mouth once daily., Disp: 30 tablet, Rfl: 11    ROS:                                                                                                                        Review of Systems   Constitutional:  Negative for chills and fever.   Respiratory:  Positive for shortness of breath. Negative for cough.    Cardiovascular:  Positive for leg swelling. Negative for chest pain and palpitations.   Gastrointestinal:  Negative for abdominal pain.   Genitourinary:  Negative for dysuria.     Objective:     PE:  Blood pressure (!) 152/82, pulse 86, temperature 99 °F (37.2 °C), temperature source Oral, resp. rate 20, height 5' 4" (1.626 m), weight 123.9 kg (273 lb 2.4 oz), SpO2 100%.   BP Readings from Last 3 Encounters:   09/03/24 (!) 152/82   08/28/24 130/79   08/21/24 117/66       Wt Readings from Last 3 Encounters:   09/03/24 123.9 kg (273 lb 2.4 oz)   08/28/24 124.5 kg (274 lb 6.4 oz)   08/21/24 124.3 kg (274 lb)     BMI 46.89 kg/m^2    Physical Exam  Constitutional:       Appearance: Normal appearance. She is obese.   HENT:      Head: Normocephalic and atraumatic.   Cardiovascular:      Rate and Rhythm: Normal rate and regular rhythm.      Pulses: Normal pulses.      Heart sounds: No murmur heard.  Pulmonary:      Effort: No respiratory distress.      Breath sounds: Normal breath sounds. No wheezing.   Abdominal:      Palpations: Abdomen is soft.      Tenderness: There is no abdominal tenderness.   Musculoskeletal:      Right lower leg: No edema.      Left lower leg: No edema.   Neurological:      General: No focal deficit present.      Mental Status: She is alert and oriented to person, place, and time.                                                                                                                                                                                                                                         " "                                                                                                                                                                                                                        CARDIAC TESTING:  EKG  No results found for this or any previous visit.  Echocardiogram  No results found for this or any previous visit.    Stress Test  No results found for this or any previous visit.     Coronary Angiogram  No results found for this or any previous visit.      Last BMP  BMP  Lab Results   Component Value Date     08/28/2024    K 3.7 08/28/2024     (H) 08/28/2024    CO2 27 08/28/2024    BUN 12.1 08/28/2024    CREATININE 0.66 08/28/2024    CALCIUM 9.6 08/28/2024    ANIONGAP 8 11/23/2022    EGFRNORACEVR >60 08/28/2024   Last CBC     Lab Results   Component Value Date    WBC 7.05 08/20/2024    HGB 12.3 08/20/2024    HCT 40.2 08/20/2024    MCV 74.4 (L) 08/20/2024     08/20/2024   Last lipids    Lab Results   Component Value Date    CHOL 175 08/20/2024    CHOL 166 04/12/2024    CHOL 172 12/13/2023     Lab Results   Component Value Date    HDL 46 08/20/2024    HDL 45 04/12/2024    HDL 45 12/13/2023     No results found for: "LDLCALC"  Lab Results   Component Value Date    TRIG 91 08/20/2024    TRIG 64 04/12/2024    TRIG 86 12/13/2023     No results found for: "CHOLHDL"    Assessment:     1. ACC/AHA stage C heart failure with reduced ejection fraction  - MRI Cardiac Morphology Function W WO; Future  - Cardiac MRI Morphology; Future  - Echo; Future    Body mass index is 46.89 kg/m².      Last PCP visit:  8/21/2024    Plan:     ACC/AHA stage C heart failure with reduced ejection fraction  NICM  HTN  NYHA class 2-3 with LVEF 25-30% on recent echocardiogram with persistent dyspnea on exertion.  Prior coronary angiograms without evidence of obstructive CAD, however last angiogram was in 2019.  Repeat angio 2024 normal coronaries  -initiate guideline directed medical therapy for " heart failure with reduced ejection fraction   -entresto 49-51  -metoprolol succinate 50 mg p.o. daily  -restart empagliflozin 10 mg p.o. daily  -start theresa 25  -echo in 3 mo (after full GDMT) to consider ICD indication  -Cardiac MRI with sedation    Pre-Op cardiac risk stratification  Surgery: Hysterectomy, elective  METs: <4  RCRI: 2, Class III  Patient is low cardiac risk for intermediate risk surgery    Follow up 4 mo  Echo 3 mo    Future Appointments   Date Time Provider Department Center   10/14/2024 10:00 AM [facility] CT2 500 LB [facility]    10/21/2024  1:40 PM Manisha Guillermo, MARY [facility] INTMED [place]   1/7/2025  2:30 PM Pankaj Singh MD [facility] CARD [place]    9/2/2025  7:50 AM Linn Clarke FNP [facility] GYN [place]

## 2024-09-04 ENCOUNTER — TELEPHONE (OUTPATIENT)
Dept: GYNECOLOGY | Facility: CLINIC | Age: 54
End: 2024-09-04
Payer: COMMERCIAL

## 2024-09-04 NOTE — TELEPHONE ENCOUNTER
On 9/3/24, she saw cardiology for clearance.  The note is in, low cardiac risk for intermediate risk surgery.

## 2024-09-15 ENCOUNTER — TELEPHONE (OUTPATIENT)
Dept: GYNECOLOGY | Facility: CLINIC | Age: 54
End: 2024-09-15
Payer: COMMERCIAL

## 2024-09-15 DIAGNOSIS — Z15.01 BRCA2 GENETIC CARRIER: Primary | ICD-10-CM

## 2024-09-15 DIAGNOSIS — Z15.09 BRCA2 GENETIC CARRIER: Primary | ICD-10-CM

## 2024-09-15 NOTE — TELEPHONE ENCOUNTER
Physician contacted patient to discuss surgical planning.   Desires surgery date 11/19/2024 for RRBSO.  Will place case request.   To pre-op clinic.     Glenna Garcia,   [employer] [facility] PGY-3

## 2024-10-02 DIAGNOSIS — Z85.3 HISTORY OF BREAST CANCER IN FEMALE: Primary | ICD-10-CM

## 2024-10-04 NOTE — PROGRESS NOTES
[facility] Breast Surg  Breast Surgical Oncology  New Patient Office Visit - H&P      Care Team: Patient Care Team:  Manisha Guillermo FNP as PCP - General (Family Medicine)   Referring Provider: Dr. Lola Herron    Chief Complaint:   Chief Complaint   Patient presents with    Breast Mass     Patient report right mastectomy swelling x a few months, constant throbbing pain x 2 weeks in right mastectomy and upper right arm, no discharge, no redness          Subjective:      History of Present Illness:  Saeid Cheema is a pleasant 54 y.o.female who is BRCA2 positive and presents as a referral from Dr. Lola Herron for breast cancer surveillance.  She has a history of L breast DCIS in 2022 and underwent BL mastectomy and left sentinel lymph node biopsy with Dr. Herron.  Pathology revealed no residual DCIS in the left breast and all lymph nodes were negative.  She saw Dr. Harvey and was not a candidate for reconstruction at that time.  Since her surgery she has had complications on the right breast with skin necrosis and wound healing issues.  She has also had extreme pain since the surgery.  Pain has worsened within the last 2 weeks and she describes it as 10/10.  The pain is all throughout her right chest wall and in the tissue underneath her R axilla which she states is constantly swollen. The pain is worsened when she moves her right arm.  She describes the pain as constant, achy pain as well as sometimes sharp, shooting pain.  She has tried compression garments and different bras.  She has also tried physical therapy and nothing seems to have helped.  She does take Aleve daily for the pain.    Her last breast imaging was a ultrasound in October of 2023 of the right chest wall which did not show any abnormalities.  She denies feeling any new masses in either chest wall.  She is not able to do MRI due to extreme claustrophobia.   She states that she has been working on  losing weight with diet and exercise.  And she has been on weight loss shots for the last couple of months.  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.  Planning on RRSO in November.       Imaging:   US R breast 10/3/2023: BIRADS 2. No acute or concerning chest wall pathology is detected.  No right axillary adenopathy.    I have reviewed the imaging and agree with the radiologists interpretation. I have discussed these results with the patient.    Pathology:   No new    OB / GYN History   Menarche Onset:10  Menopause: Menopause: postmenopausal  Hormonal birth control (duration): 34years  Pregnancies: 7  Age at first child birth: 18  Child births: 4  Hysterectomy/Oophorectomy: hysterectomy without BSO, at age 45  HRT: no    Family History of Cancer (& age at diagnosis):  -   Family History   Problem Relation Name Age of Onset    Hypertension Mother Carine Velazquez     Heart disease Mother Carine Velazquez     Asthma Mother Carine Velazquez     Hypertension Father Pankaj Malhotra     Heart disease Father Pankaj Malhotra     Ovarian cancer Maternal Grandmother      Breast cancer Maternal Grandmother      Breast cancer Paternal Grandmother      Cervical cancer Daughter  32        Lifestyle:  Height and Weight:   BMI: Body mass index is 46.52 kg/m².       Other History:     Past Medical History:   Diagnosis Date    Ductal carcinoma in situ (DCIS) of left breast 10/10/2022    GERD (gastroesophageal reflux disease)     Hypertension     Migraines     Vitamin D deficiency         Past Surgical History:   Procedure Laterality Date    ANGIOGRAM, CORONARY, WITH LEFT HEART CATHETERIZATION N/A 07/29/2024    Procedure: Angiogram, Coronary, with Left Heart Cath;  Surgeon: Pankaj Singh MD;  Location: [facility] CATH LAB;  Service: Cardiology;  Laterality: N/A;    BREAST SURGERY  11/30/2022    COMPUTED TOMOGRAPHY ANGIOGRAM  09/07/2012    MASTECTOMY Bilateral 11/30/2022    PARTIAL HYSTERECTOMY  07/13/2023        Social History     Socioeconomic History     Marital status:     Number of children: 5   Occupational History    Occupation: Employed   Tobacco Use    Smoking status: Never     Passive exposure: Never    Smokeless tobacco: Never   Substance and Sexual Activity    Alcohol use: Not Currently     Comment: Occasionally    Drug use: Never    Sexual activity: Not Currently     Partners: Male     Birth control/protection: Post-menopausal     Social Drivers of Health     Financial Resource Strain: Medium Risk (8/20/2024)    Overall Financial Resource Strain (CARDIA)     Difficulty of Paying Living Expenses: Somewhat hard   Food Insecurity: Food Insecurity Present (8/20/2024)    Hunger Vital Sign     Worried About Running Out of Food in the Last Year: Sometimes true     Ran Out of Food in the Last Year: Sometimes true   Transportation Needs: No Transportation Needs (4/15/2024)    PRAPARE - Transportation     Lack of Transportation (Medical): No     Lack of Transportation (Non-Medical): No   Physical Activity: Insufficiently Active (8/20/2024)    Exercise Vital Sign     Days of Exercise per Week: 1 day     Minutes of Exercise per Session: 20 min   Stress: Stress Concern Present (8/20/2024)    [employer] - Occupational Stress Questionnaire     Feeling of Stress : Very much   Housing Stability: High Risk (8/20/2024)    Housing Stability Vital Sign     Unable to Pay for Housing in the Last Year: Yes     Homeless in the Last Year: No        Immunization History   Administered Date(s) Administered    COVID-19, MRNA, LN-S, PF (MODERNA FULL 0.5 ML DOSE) 12/29/2020, 01/28/2021    DTP 01/29/1971, 03/02/1971, 03/30/1971, 08/14/1975    Hepatitis A / Hepatitis B 07/06/2010    Hepatitis B, Adult 10/07/2014    Influenza - Intranasal - Trivalent 12/09/2009    Influenza - Quadrivalent - PF *Preferred* (6 months and older) 11/09/2022    Influenza - Trivalent - Fluarix, Flulaval, Fluzone, Afluria - PF 10/29/2018, 10/01/2019, 09/08/2020    Influenza A  (H1N1) 2009 Monovalent - IM 12/09/2009    Measles / Rubella 03/26/1973    OPV 01/29/1971, 03/30/1971, 08/14/1975, 07/07/1977    Td (ADULT) 07/07/1977    Tdap 07/06/2010        Medications/Allergies:       Current Outpatient Medications   Medication Instructions    albuterol (PROVENTIL/VENTOLIN HFA) 90 mcg/actuation inhaler 2 puffs, Inhalation, Every 6 hours PRN, Rescue    albuterol-ipratropium (DUO-NEB) 2.5 mg-0.5 mg/3 mL nebulizer solution 3 mLs, Nebulization, Every 6 hours PRN, Rescue    ascorbic acid (vitamin C) (VITAMIN C) 1,000 mg, Daily    cholecalciferol (vitamin D3) 50,000 Units, Oral, Every 7 days    cloNIDine (CATAPRES) 0.2 mg, Oral, Nightly    empagliflozin (JARDIANCE) 10 mg, Oral, Daily    furosemide (LASIX) 40 mg, Oral, Daily    meclizine (ANTIVERT) 25 mg, Oral, 3 times daily PRN    metoprolol succinate (TOPROL-XL) 25 mg, Oral, Daily    nitroGLYCERIN (NITROSTAT) 0.4 mg, Sublingual, Every 5 min PRN    omeprazole (PRILOSEC) 40 mg, Oral, Daily    ondansetron (ZOFRAN) 8 mg, Oral, Every 12 hours PRN    polyethylene glycol (MOVIPREP) 100-7.5-2.691 gram solution Take as directed prior to colonoscopy    QULIPTA 60 mg, Oral, Daily    sacubitriL-valsartan (ENTRESTO) 49-51 mg per tablet 1 tablet, Oral, 2 times daily    spironolactone (ALDACTONE) 25 mg, Oral, Daily    valACYclovir (VALTREX) 500 mg, Oral, Daily    WEGOVY 0.25 mg, Subcutaneous, Every 7 days       Review of patient's allergies indicates:   Allergen Reactions    Codeine Shortness Of Breath     Pt denies codeine allergy    Shellfish derived Rash     States eats crawfish        Review of Systems:      ROS    Constitutional: denies fevers, chills, weight loss  HEENT: denies blurry/double vision, changes in hearing, odynophagia, dysphagia  Respiratory: denies cough, shortness of breath  Cardiovascular: denies palpitations, swelling of the extremities  GI: denies abdominal pain, nausea/vomiting, hematochezia, frequent stools  : denies frequency, dysuria,  "flank pain, hematuria  Skin: denies new rashes  Neurological: denies muscular/sensory deficiencies, loss of coordination, headaches, memory changes  Endo: denies hair loss/thinning, nervousness, hot flashes, heat/cold intolerance, lumps in the neck area  Heme: denies easy bruising and fatigue  Psychological: denies anxious/depressive moods  Musculoskeletal: denies bony pain, muscle cramps, swollen joints       Objective/Physical Exam   Visit Vitals  /79 (BP Location: Left arm, Patient Position: Sitting)   Pulse 84   Temp 98.8 °F (37.1 °C) (Oral)   Resp 20   Ht 5' 4" (1.626 m)   Wt 122.9 kg (271 lb)   SpO2 95%   BMI 46.52 kg/m²        Physical Exam    General: The patient is awake, alert and oriented. The patient is well nourished. No acute distress.  Neck: The neck is supple. The thyroid is not enlarged.  Musculoskeletal: The patient has decreased range of motion of her right upper extremity.  Heart: Regular rate and rhythm, positive murmurs.   Lung: No increased work of breathing. Clear breath sounds bilaterally.  Lymph nodes: There is no axillary, supraclavicular or cervical lymphadenopathy.  Breast:  Bilateral mastectomy scars well healed.  No skin changes.  No palpable masses.  Extreme tenderness with palpation of the right chest wall and excess tissue underneath the right axilla.     Assessment and Plan     1. History of breast cancer in female  - Ambulatory referral/consult to Breast Surgery    2. BRCA2 genetic carrier    3. Breast pain    Saeid Cheema is a 54 y.o.female with history of BRCA 2 mutation and left breast DCIS and is status post bilateral mastectomy without reconstruction in 2022.  She has had chronic pain of the right chest wall since her surgery and increase in the pain recently.  There are no abnormalities on her physical exam.     - We will obtain diagnostic imaging of the right chest wall and axilla given her ongoing symptoms and her last imaging was over one year ago.    - Will " do a trial of gabapentin, 100mg TID prescribed  - Recommend physical therapy and stressed importance of doing the at home exercises daily, will refer to [facility].  - Recommend continuing to work on lifestyle changes with diet and exercise.   - She would like referral to a plastic surgeon to discuss removal of the excess tissue beneath her axilla where the majority of her pain is. Referral placed.        The patient was encouraged to continue her self breast exam. If she notes any changes or has any concerns with her breast in the interim she was encouraged to call the breast center to schedule an appointment as soon as possible.  We will see her back in 3 months.      Comfort Naranjo MD  Breast Surgical Oncology       I spent a total of 60 minutes on the day of the visit.  This includes face to face time and non-face to face time preparing to see the patient (eg, review of tests), obtaining and/or reviewing separately obtained history, documenting clinical information in the electronic or other health record, independently interpreting results and communicating results to the patient/family/caregiver, or care coordinator.

## 2024-10-07 ENCOUNTER — OFFICE VISIT (OUTPATIENT)
Dept: SURGERY | Facility: CLINIC | Age: 54
End: 2024-10-07
Payer: COMMERCIAL

## 2024-10-07 VITALS
DIASTOLIC BLOOD PRESSURE: 79 MMHG | HEART RATE: 84 BPM | RESPIRATION RATE: 20 BRPM | BODY MASS INDEX: 46.26 KG/M2 | HEIGHT: 64 IN | WEIGHT: 271 LBS | OXYGEN SATURATION: 95 % | TEMPERATURE: 99 F | SYSTOLIC BLOOD PRESSURE: 139 MMHG

## 2024-10-07 DIAGNOSIS — N64.4 BREAST PAIN: Primary | ICD-10-CM

## 2024-10-07 DIAGNOSIS — Z15.09 BRCA2 GENETIC CARRIER: Primary | ICD-10-CM

## 2024-10-07 DIAGNOSIS — Z85.3 HISTORY OF BREAST CANCER IN FEMALE: ICD-10-CM

## 2024-10-07 DIAGNOSIS — Z15.01 BRCA2 GENETIC CARRIER: Primary | ICD-10-CM

## 2024-10-07 DIAGNOSIS — N64.4 BREAST PAIN: ICD-10-CM

## 2024-10-07 PROCEDURE — 99205 OFFICE O/P NEW HI 60 MIN: CPT | Mod: S$GLB,,, | Performed by: STUDENT IN AN ORGANIZED HEALTH CARE EDUCATION/TRAINING PROGRAM

## 2024-10-07 PROCEDURE — 99999 PR PBB SHADOW E&M-EST. PATIENT-LVL V: CPT | Mod: PBBFAC,,, | Performed by: STUDENT IN AN ORGANIZED HEALTH CARE EDUCATION/TRAINING PROGRAM

## 2024-10-07 RX ORDER — GABAPENTIN 100 MG/1
100 CAPSULE ORAL 3 TIMES DAILY
Qty: 90 CAPSULE | Refills: 11 | Status: SHIPPED | OUTPATIENT
Start: 2024-10-07 | End: 2025-10-07

## 2024-10-21 ENCOUNTER — OFFICE VISIT (OUTPATIENT)
Dept: INTERNAL MEDICINE | Facility: CLINIC | Age: 54
End: 2024-10-21
Payer: COMMERCIAL

## 2024-10-21 ENCOUNTER — HOSPITAL ENCOUNTER (OUTPATIENT)
Dept: RADIOLOGY | Facility: HOSPITAL | Age: 54
Discharge: HOME | End: 2024-10-21
Attending: NURSE PRACTITIONER
Payer: COMMERCIAL

## 2024-10-21 VITALS
SYSTOLIC BLOOD PRESSURE: 148 MMHG | RESPIRATION RATE: 18 BRPM | BODY MASS INDEX: 47.12 KG/M2 | WEIGHT: 276 LBS | TEMPERATURE: 98 F | HEIGHT: 64 IN | DIASTOLIC BLOOD PRESSURE: 78 MMHG | HEART RATE: 72 BPM

## 2024-10-21 DIAGNOSIS — J32.9 SINUSITIS, UNSPECIFIED CHRONICITY, UNSPECIFIED LOCATION: ICD-10-CM

## 2024-10-21 DIAGNOSIS — R06.02 SOB (SHORTNESS OF BREATH): ICD-10-CM

## 2024-10-21 DIAGNOSIS — R05.9 COUGH, UNSPECIFIED TYPE: ICD-10-CM

## 2024-10-21 DIAGNOSIS — R06.09 DOE (DYSPNEA ON EXERTION): ICD-10-CM

## 2024-10-21 DIAGNOSIS — E55.9 VITAMIN D DEFICIENCY: ICD-10-CM

## 2024-10-21 DIAGNOSIS — R73.03 PREDIABETES: ICD-10-CM

## 2024-10-21 DIAGNOSIS — R94.2 RESTRICTIVE PATTERN PRESENT ON PULMONARY FUNCTION TESTING: ICD-10-CM

## 2024-10-21 DIAGNOSIS — E66.01 OBESITY, CLASS III, BMI 40-49.9 (MORBID OBESITY): ICD-10-CM

## 2024-10-21 DIAGNOSIS — I10 PRIMARY HYPERTENSION: ICD-10-CM

## 2024-10-21 PROCEDURE — 71250 CT THORAX DX C-: CPT | Mod: TC

## 2024-10-21 PROCEDURE — 99215 OFFICE O/P EST HI 40 MIN: CPT | Mod: PBBFAC,25 | Performed by: NURSE PRACTITIONER

## 2024-10-21 PROCEDURE — 99214 OFFICE O/P EST MOD 30 MIN: CPT | Mod: S$PBB,,, | Performed by: NURSE PRACTITIONER

## 2024-10-21 RX ORDER — AMOXICILLIN 875 MG/1
875 TABLET, FILM COATED ORAL EVERY 12 HOURS
Qty: 14 TABLET | Refills: 0 | Status: SHIPPED | OUTPATIENT
Start: 2024-10-21 | End: 2024-10-28

## 2024-10-21 RX ORDER — METHYLPREDNISOLONE 4 MG/1
TABLET ORAL
Qty: 21 EACH | Refills: 0 | Status: SHIPPED | OUTPATIENT
Start: 2024-10-21

## 2024-10-21 RX ORDER — BENZONATATE 100 MG/1
100 CAPSULE ORAL 3 TIMES DAILY PRN
Qty: 45 CAPSULE | Refills: 0 | Status: SHIPPED | OUTPATIENT
Start: 2024-10-21 | End: 2024-10-31

## 2024-10-21 NOTE — PROGRESS NOTES
Internal Medicine Clinic  MARY Cunha     Patient Name: Saeid Cheema   : 1970  MRN:[id number]     Chief Complaint     Chief Complaint   Patient presents with    Hypertension    Cough     Cough x 2 weeks with yellow sputum        History of Present Illness     54 year old AAF, presents in clinic for c/o  productive cough and sinus congestion x 1 wk. Afebrile today. States she ran a temp 1 wk prior.  PMH HTN, HLD, prediabetes, CAD, HFrEF, left breast cancer 10/7/2022, left lower lobe lung nodule, GERD, migraines, genital herpes, IC, hematuria, chlamydia pneumonia 3/2022, BV. Nonsmoker. PMH TIA, and small cerebral aneurysm in . 2021; MR angio head and MRI brain without acute findings. EEG 2021; normal.   Following Dr. Shankar [facility] NEURO for migraines. He is trying to get her approved for BOTOX injections for migraine prevention. Followed by [facility] GYN and [facility] Urology clinic for further workup of hematuria; cystoscopy 2020 displayed Microscopic hematuria, Squamous metaplasia. Status post bilateral mastectomy 2022 [facility] with the large open wound in the right chest. Seroma in the region of the left lateral chest wall. Wound are at OL per Dr. Rowley. [facility] [address], Dr. Herron and [facility].   She is on valacyclovir 500mg daily for genital herpes-suppression; voices 3 outbreaks per yr, in which she doubles her med for 5 days during an outbreak.   PFT 8/10/2022 moderate restriction, no bronchodilator response. Normal lung volumes. Although she is requesting refill on albuterol as she states she feels it helps her breathing.  PFT 10/26/23 WNL  States her  passed away of cardiac complications 2022. Lives with 15 year old grandson.   Denies CP, SOB, cough, fever, dizziness, weakness, abdominal pain, nausea, vomiting, diarrhea, constipation, depression, SI, HI.  Works at [employer]  Colonoscopy with polyps, Dr. Richardson ;  "told she needed to repeat scope but states she could not afford the repeat scope.    She has FMLA intermittent leave paperwork for h/o migraines.                            Review of Systems     Review of Systems   Constitutional: Negative.    HENT:  Positive for sinus pressure/congestion.    Eyes: Negative.    Respiratory:  Positive for cough.    Cardiovascular: Negative.    Gastrointestinal: Negative.    Endocrine: Negative.    Genitourinary: Negative.    Musculoskeletal: Negative.    Integumentary:  Negative.   Allergic/Immunologic: Negative.    Neurological: Negative.    Hematological: Negative.    Psychiatric/Behavioral: Negative.     All other systems reviewed and are negative.       Physical Examination     Visit Vitals  BP (!) 148/78 (BP Location: Right arm, Patient Position: Sitting)   Pulse 72   Temp 98.3 °F (36.8 °C) (Oral)   Resp 18   Ht 5' 4" (1.626 m)   Wt 125.2 kg (276 lb)   BMI 47.38 kg/m²        BP Readings from Last 6 Encounters:   10/21/24 (!) 148/78   10/07/24 139/79   09/03/24 (!) 152/82   08/28/24 130/79   08/21/24 117/66   07/29/24 (!) 150/81   ]    Wt Readings from Last 6 Encounters:   10/21/24 125.2 kg (276 lb)   10/07/24 122.9 kg (271 lb)   09/03/24 123.9 kg (273 lb 2.4 oz)   08/28/24 124.5 kg (274 lb 6.4 oz)   08/21/24 124.3 kg (274 lb)   07/29/24 120.2 kg (265 lb)   ]    BMI Readings from Last 3 Encounters:   10/21/24 47.38 kg/m²   10/07/24 46.52 kg/m²   09/03/24 46.89 kg/m²         Physical Exam  Vitals and nursing note reviewed.   Constitutional:       Appearance: Normal appearance.   HENT:      Head: Normocephalic and atraumatic.      Right Ear: Tympanic membrane, ear canal and external ear normal.      Left Ear: Tympanic membrane, ear canal and external ear normal.      Nose: Congestion present.      Left Sinus: Maxillary sinus tenderness present.      Mouth/Throat:      Mouth: Mucous membranes are moist.      Pharynx: Oropharynx is clear.   Eyes:      Extraocular Movements: " Extraocular movements intact.      Conjunctiva/sclera: Conjunctivae normal.      Pupils: Pupils are equal, round, and reactive to light.   Cardiovascular:      Rate and Rhythm: Normal rate and regular rhythm.      Pulses: Normal pulses.      Heart sounds: Normal heart sounds.   Pulmonary:      Effort: Pulmonary effort is normal.      Breath sounds: Normal breath sounds.   Abdominal:      General: Abdomen is flat. Bowel sounds are normal.      Palpations: Abdomen is soft.   Musculoskeletal:         General: Normal range of motion.      Cervical back: Normal range of motion and neck supple.   Skin:     General: Skin is warm and dry.      Capillary Refill: Capillary refill takes less than 2 seconds.   Neurological:      General: No focal deficit present.      Mental Status: She is alert and oriented to person, place, and time. Mental status is at baseline.   Psychiatric:         Mood and Affect: Mood normal.         Behavior: Behavior normal.         Thought Content: Thought content normal.         Judgment: Judgment normal.          Labs / Imaging     Chemistry:  Lab Results   Component Value Date     08/28/2024     11/23/2022    K 3.7 08/28/2024    K 3.8 11/23/2022    BUN 12.1 08/28/2024    BUN 10 11/23/2022    CREATININE 0.66 08/28/2024    CREATININE 0.65 11/23/2022    EGFRNORACEVR >60 08/28/2024    GLUCOSE 59 (L) 08/28/2024    CALCIUM 9.6 08/28/2024    CALCIUM 9.5 11/23/2022    ALKPHOS 96 08/28/2024    LABPROT 7.2 08/28/2024    ALBUMIN 3.4 (L) 08/28/2024    BILIDIR 0.1 07/13/2022    IBILI 0.10 01/25/2022    AST 15 08/28/2024    ALT 12 08/28/2024    MG 1.90 06/04/2024    DQORPHUI61AN 49 08/20/2024        Lab Results   Component Value Date    HGBA1C 5.9 08/28/2024        Hematology:  Lab Results   Component Value Date    WBC 7.05 08/20/2024    RBC 5.40 08/20/2024    HGB 12.3 08/20/2024    HCT 40.2 08/20/2024    MCV 74.4 (L) 08/20/2024    MCH 22.8 (L) 08/20/2024    MCHC 30.6 (L) 08/20/2024    RDW 15.9  08/20/2024     08/20/2024    MPV 12.6 (H) 08/20/2024        Lipid Panel:  Lab Results   Component Value Date    CHOL 175 08/20/2024    HDL 46 08/20/2024    .00 08/20/2024    TRIG 91 08/20/2024    TOTALCHOLEST 4 08/20/2024        Urine:  Lab Results   Component Value Date    APPEARANCEUA Clear 08/20/2024    SGUA 1.029 08/20/2024    PROTEINUA Trace (A) 08/20/2024    KETONESUA Negative 08/20/2024    LEUKOCYTESUR Negative 08/20/2024    RBCUA 0-5 08/20/2024    WBCUA 0-5 08/20/2024    BACTERIA Trace (A) 08/20/2024    SQEPUA Moderate (A) 08/20/2024    HYALINECASTS 0-2 (A) 08/20/2024    CREATRANDUR 222.0 02/19/2018          Assessment       ICD-10-CM ICD-9-CM   1. Sinusitis, unspecified chronicity, unspecified location  J32.9 473.9   2. Primary hypertension  I10 401.9   3. Cough, unspecified type  R05.9 786.2   4. DUARTE (dyspnea on exertion)  R06.09 786.09   5. Obesity, Class III, BMI 40-49.9 (morbid obesity)  E66.01 278.01        Plan   1. Primary hypertension  Cont meds as prescribed. DASH diet: Eat more fruits, vegetables, and low fat dairy foods.  (Less than 2 grams of sodium per day).  Maintain healthy weight with goal BMI <30.   Exercise 30 minutes per day 5 days per week.  Home medications refilled and continued.   Home BP monitoring encouraged with BP parameters given.      2. Cough, unspecified type  RX AMOX, medrol anna marie, and tessalon  Fluids and restED precautions worsening or development of fever    3. Sinusitis, unspecified chronicity, unspecified location (Primary)  RX AMOX, medrol anna marie, and tessalon  Fluids and rest  ED precautions worsening or development of fever    4. DUARTE (dyspnea on exertion)  Scheduled for f/u CT Chest  - Complete PFT with bronchodilator; Future    5. Obesity, Class III, BMI 40-49.9 (morbid obesity)  Goal BMI <30.  Exercise 5 times a week for 30 minutes per day.  Avoid soda, simple sugars, excessive rice, potatoes or bread. Limit fast foods and fried foods.  Choose complex carbs  in moderation (example: green vegetables, beans, oatmeal). Eat plenty of fresh fruits and vegetables with lean meats daily.  Do not skip meals. Eat a balanced portion size.  Avoid fad diets. Consider permanent healthy life style changes.           Current Outpatient Medications   Medication Instructions    albuterol (PROVENTIL/VENTOLIN HFA) 90 mcg/actuation inhaler 2 puffs, Inhalation, Every 6 hours PRN, Rescue    albuterol-ipratropium (DUO-NEB) 2.5 mg-0.5 mg/3 mL nebulizer solution 3 mLs, Nebulization, Every 6 hours PRN, Rescue    ascorbic acid (vitamin C) (VITAMIN C) 1,000 mg, Daily    cholecalciferol (vitamin D3) 50,000 Units, Oral, Every 7 days    cloNIDine (CATAPRES) 0.2 mg, Oral, Nightly    empagliflozin (JARDIANCE) 10 mg, Oral, Daily    furosemide (LASIX) 40 mg, Oral, Daily    gabapentin (NEURONTIN) 100 mg, Oral, 3 times daily    meclizine (ANTIVERT) 25 mg, Oral, 3 times daily PRN    methylPREDNISolone (MEDROL DOSEPACK) 4 mg tablet use as directed    metoprolol succinate (TOPROL-XL) 25 mg, Oral, Daily    nitroGLYCERIN (NITROSTAT) 0.4 mg, Sublingual, Every 5 min PRN    omeprazole (PRILOSEC) 40 mg, Oral, Daily    ondansetron (ZOFRAN) 8 mg, Oral, Every 12 hours PRN    polyethylene glycol (MOVIPREP) 100-7.5-2.691 gram solution Take as directed prior to colonoscopy    QULIPTA 60 mg, Oral, Daily    sacubitriL-valsartan (ENTRESTO) 49-51 mg per tablet 1 tablet, Oral, 2 times daily    spironolactone (ALDACTONE) 25 mg, Oral, Daily    valACYclovir (VALTREX) 500 mg, Oral, Daily       Orders Placed This Encounter   Procedures    Complete PFT with bronchodilator         Future Appointments   Date Time Provider Department Center   12/11/2024  7:00 AM [facility] 01 [facility] [place]    1/7/2025  2:30 PM Pankaj Singh MD [facility] [place]   1/9/2025 11:40 AM Comfort Naranjo MD [facility] [place]   1/21/2025  7:00 AM Manisha Guillermo, [facility] [place]        Follow up in about 3 months (around  1/21/2025) for lab review.    Labs thoroughly reviewed with patient. Medication refills addressed today.  RTC prn and 3-4 months, with labs 1 week prior to the apt.  COVID 19 precautions given to patient.  Patient voices understanding of all discharge instructions.      MARY Cunha

## 2024-10-24 ENCOUNTER — TELEPHONE (OUTPATIENT)
Dept: INTERNAL MEDICINE | Facility: CLINIC | Age: 54
End: 2024-10-24
Payer: COMMERCIAL

## 2024-10-24 NOTE — TELEPHONE ENCOUNTER
----- Message from MARY Voss sent at 10/22/2024  4:32 PM CDT -----  Please inform pt CT lungs was reviewed, and does show mild atelectasis or mucous plugging. I started her on antibiotics and steroids yesterday, and she can use the nebulized treatment tid for the next 5-7 days to loosen and thin mucous. Stay WELL hydrated to thin secretions and can try mucinex as well. Give her ER precautions for fever, SOB, or chest pain, or for worsening of current symptoms.

## 2024-10-24 NOTE — TELEPHONE ENCOUNTER
Spoke to pt . Informed her of PCP 's message below. Pt verbalized understanding.        Message  Received: 2 days ago  Manisha Guillermo, FNP  [facility] Staff  Please inform pt CT lungs was reviewed, and does show mild atelectasis or mucous plugging. I started her on antibiotics and steroids yesterday, and she can use the nebulized treatment tid for the next 5-7 days to loosen and thin mucous. Stay WELL hydrated to thin secretions and can try mucinex as well. Give her ER precautions for fever, SOB, or chest pain, or for worsening of current symptoms.

## 2024-10-28 ENCOUNTER — HOSPITAL ENCOUNTER (OUTPATIENT)
Dept: RADIOLOGY | Facility: HOSPITAL | Age: 54
Discharge: HOME | End: 2024-10-28
Attending: STUDENT IN AN ORGANIZED HEALTH CARE EDUCATION/TRAINING PROGRAM
Payer: COMMERCIAL

## 2024-10-28 DIAGNOSIS — Z15.09 BRCA2 GENETIC CARRIER: ICD-10-CM

## 2024-10-28 DIAGNOSIS — Z15.01 BRCA2 GENETIC CARRIER: ICD-10-CM

## 2024-10-28 DIAGNOSIS — N64.4 BREAST PAIN: ICD-10-CM

## 2024-10-28 DIAGNOSIS — Z85.3 HISTORY OF BREAST CANCER IN FEMALE: ICD-10-CM

## 2024-10-28 PROCEDURE — 76882 US LMTD JT/FCL EVL NVASC XTR: CPT | Mod: TC,LT

## 2024-10-28 PROCEDURE — 76882 US LMTD JT/FCL EVL NVASC XTR: CPT | Mod: 26,LT,, | Performed by: STUDENT IN AN ORGANIZED HEALTH CARE EDUCATION/TRAINING PROGRAM

## 2024-10-28 PROCEDURE — 76641 ULTRASOUND BREAST COMPLETE: CPT | Mod: TC,RT

## 2024-10-28 PROCEDURE — 76641 ULTRASOUND BREAST COMPLETE: CPT | Mod: 26,RT,, | Performed by: STUDENT IN AN ORGANIZED HEALTH CARE EDUCATION/TRAINING PROGRAM

## 2024-11-04 ENCOUNTER — HOSPITAL ENCOUNTER (OUTPATIENT)
Dept: PULMONOLOGY | Facility: HOSPITAL | Age: 54
Discharge: HOME | End: 2024-11-04
Attending: NURSE PRACTITIONER
Payer: COMMERCIAL

## 2024-11-04 VITALS — OXYGEN SATURATION: 97 % | HEART RATE: 77 BPM | RESPIRATION RATE: 20 BRPM

## 2024-11-04 DIAGNOSIS — R06.09 DOE (DYSPNEA ON EXERTION): ICD-10-CM

## 2024-11-04 PROCEDURE — 94726 PLETHYSMOGRAPHY LUNG VOLUMES: CPT

## 2024-11-04 PROCEDURE — 94060 EVALUATION OF WHEEZING: CPT

## 2024-11-04 PROCEDURE — 94640 AIRWAY INHALATION TREATMENT: CPT | Mod: XB

## 2024-11-04 PROCEDURE — 94729 DIFFUSING CAPACITY: CPT

## 2024-11-04 RX ORDER — ALBUTEROL SULFATE 0.83 MG/ML
2.5 SOLUTION RESPIRATORY (INHALATION) EVERY 4 HOURS PRN
Status: DISPENSED | OUTPATIENT
Start: 2024-11-04

## 2024-11-04 RX ADMIN — ALBUTEROL SULFATE 2.5 MG: 0.83 SOLUTION RESPIRATORY (INHALATION) at 08:11

## 2024-11-04 NOTE — PROGRESS NOTES
Good cooperation and effort given. Patient struggled with a cough throughout testing. Albuterol 2.5 mg given HR 77/74, SAT 97%, BBS diminished  PFT completed

## 2024-11-06 LAB
DLCO SINGLE BREATH LLN: 17.89
DLCO SINGLE BREATH PRE REF: 72.6 %
DLCO SINGLE BREATH REF: 23.62
DLCOC SBVA LLN: 3.28
DLCOC SBVA REF: 4.78
DLCOC SINGLE BREATH LLN: 17.89
DLCOC SINGLE BREATH REF: 23.62
DLCOVA LLN: 3.28
DLCOVA PRE REF: 142.6 %
DLCOVA PRE: 6.82 ML/(MIN*MMHG*L) (ref 3.28–6.29)
DLCOVA REF: 4.78
ERV LLN: -16449.11
ERV PRE REF: 128.5 %
ERV REF: 0.89
FEF 25 75 CHG: 19 %
FEF 25 75 LLN: 1.72
FEF 25 75 POST REF: 81.1 %
FEF 25 75 PRE REF: 68.1 %
FEF 25 75 REF: 3.12
FET100 CHG: -56.4 %
FEV1 CHG: 3.1 %
FEV1 FVC CHG: 3 %
FEV1 FVC LLN: 69
FEV1 FVC POST REF: 109.5 %
FEV1 FVC PRE REF: 106.3 %
FEV1 FVC REF: 80
FEV1 LLN: 1.71
FEV1 POST REF: 79.3 %
FEV1 PRE REF: 76.9 %
FEV1 REF: 2.29
FRCPLETH LLN: 1.87
FRCPLETH PREREF: 74.8 %
FRCPLETH REF: 2.7
FVC CHG: 0.1 %
FVC LLN: 2.16
FVC POST REF: 72.1 %
FVC PRE REF: 72 %
FVC REF: 2.86
IVC PRE: 1.39 L (ref 2.16–3.59)
IVC SINGLE BREATH LLN: 2.16
IVC SINGLE BREATH PRE REF: 48.7 %
IVC SINGLE BREATH REF: 2.86
MVV LLN: 84
MVV PRE REF: 68.5 %
MVV REF: 99
PEF CHG: -23.4 %
PEF LLN: 3.97
PEF POST REF: 50.2 %
PEF PRE REF: 65.6 %
PEF REF: 5.98
POST FEF 25 75: 2.53 L/S (ref 1.72–4.51)
POST FET 100: 2.53 SEC
POST FEV1 FVC: 88.1 % (ref 69.34–89.92)
POST FEV1: 1.82 L (ref 1.71–2.85)
POST FVC: 2.06 L (ref 2.16–3.59)
POST PEF: 3 L/S (ref 3.97–7.98)
PRE DLCO: 17.15 ML/(MIN*MMHG) (ref 17.89–29.36)
PRE ERV: 1.14 L (ref -16449.11–16450.89)
PRE FEF 25 75: 2.12 L/S (ref 1.72–4.51)
PRE FET 100: 5.81 SEC
PRE FEV1 FVC: 85.53 % (ref 69.34–89.92)
PRE FEV1: 1.76 L (ref 1.71–2.85)
PRE FRC PL: 2.02 L (ref 1.87–3.52)
PRE FVC: 2.06 L (ref 2.16–3.59)
PRE MVV: 67.69 L/MIN (ref 84.02–113.67)
PRE PEF: 3.92 L/S (ref 3.97–7.98)
PRE RV: 0.87 L (ref 1.23–2.38)
PRE TLC: 2.95 L (ref 3.95–5.93)
RAW LLN: 3.06
RAW PRE REF: 119.1 %
RAW PRE: 3.64 CMH2O*S/L (ref 3.06–3.06)
RAW REF: 3.06
RV LLN: 1.23
RV PRE REF: 48.4 %
RV REF: 1.81
RVTLC LLN: 28
RVTLC PRE REF: 79.3 %
RVTLC PRE: 29.6 % (ref 27.73–46.91)
RVTLC REF: 37
TLC LLN: 3.95
TLC PRE REF: 59.8 %
TLC REF: 4.94
VA PRE: 2.52 L (ref 4.79–4.79)
VA SINGLE BREATH LLN: 4.79
VA SINGLE BREATH PRE REF: 52.5 %
VA SINGLE BREATH REF: 4.79
VC LLN: 2.16
VC PRE REF: 72.7 %
VC PRE: 2.08 L (ref 2.16–3.59)
VC REF: 2.86

## 2024-11-12 NOTE — PROGRESS NOTES
[facility] GYNECOLOGY CLINIC NOTE    Subjective:     HPI:  Saied Cheema is a 54 y.o.  with BRCA2 mutation presenting for a preoperative consultation for risk reducing bilateral salpingo-oophorectomy (rrBSO).    Of note, patient admitted to outside hospital last week for chest pain and noted to have new pulmonary lesion that has not been addressed yet. Repeat TTE was done with some abnormal findings but patient ultimately discharged in stable condition with plan for close follow up with her cardiologist at [facility]. She was previously cleared for surgery by cardiology but has not seen them since this admission. Also has a pulmonologist but has not been seen since the new pulmonary lesion noted on imaging.    Family hx includes maternal and paternal grandmother with breast cancer and paternal grandmother with hx of ovarian cancer. Pt was diagnosed with left breast DCIS in  and underwent bilateral mastectomy. Pathology showed negative for malignancy in the right breast. Left breast pathology showed no evidence of residual DCIS and no evidence of invasive carcinoma. She did have single focus of atypical ductal hyperplasia. Left sentinel and axillary nodes were negative for metastatic carcinoma. She is followed by Dr. Herron at [facility].    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    GERD (gastroesophageal reflux disease)     Hypertension     Migraines     Vitamin D deficiency        Past Surgical History:  Past Surgical History:   Procedure Laterality Date    ANGIOGRAM, CORONARY, WITH LEFT HEART CATHETERIZATION N/A 2024    Procedure: Angiogram, Coronary, with Left Heart Cath;  Surgeon: Pankaj Singh MD;  Location: [facility] CATH LAB;  Service: Cardiology;  Laterality: N/A;    BREAST SURGERY  2022    COMPUTED TOMOGRAPHY ANGIOGRAM  2012    MASTECTOMY Bilateral 2022    PARTIAL HYSTERECTOMY  2023        Gynecologic History:  S/p supracervical hysterectomy in 2013 for AUB.  STD Hx: HSV2      Obstetric History:  OB History    Para Term  AB Living   7 4 4   3 4   SAB IAB Ectopic Multiple Live Births   3       4      # Outcome Date GA Lbr Ed/2nd Weight Sex Type Anes PTL Lv   7 Term      Vag-Spont   SUSAN   6 Term      Vag-Spont   SUSAN   5 Term      Vag-Spont   SUSAN   4 Term      Vag-Spont   SUSAN   3 SAB      SAB   FD   2 SAB      SAB   FD   1 SAB      SAB   FD       Family History:  Family History   Problem Relation Name Age of Onset    Hypertension Mother Carine Velazquez     Heart disease Mother Carine Velazquez     Asthma Mother Carine Velazquez     Hypertension Father Pankaj Malhotra     Heart disease Father Pankaj Malhotra     Ovarian cancer Maternal Grandmother      Breast cancer Maternal Grandmother      Breast cancer Paternal Grandmother      Cervical cancer Daughter  32       Social History:  Social History     Socioeconomic History    Marital status:     Number of children: 5   Occupational History    Occupation: Employed   Tobacco Use    Smoking status: Never     Passive exposure: Never    Smokeless tobacco: Never   Substance and Sexual Activity    Alcohol use: Not Currently     Comment: Occasionally    Drug use: Never    Sexual activity: Not Currently     Partners: Male     Birth control/protection: Post-menopausal     Social Drivers of Health     Financial Resource Strain: Low Risk  (2024)    [facility] Subsidi[facility] and Affiliates    Overall Financial Resource Strain (CARDIA)     Difficulty of Paying Living Expenses: Not hard at all   Recent Concern: Financial Resource Strain - Medium Risk (2024)    Overall Financial Resource Strain (CARDIA)     Difficulty of Paying Living Expenses: Somewhat hard   Food Insecurity: No Food Insecurity (2024)    [facility] from [facility]  and Its Subsidiaries and Affiliates    Hunger Vital Sign     Worried About Running Out of Food in the Last Year: Never true     Ran Out of Food in the Last Year: Never true   Recent Concern: Food Insecurity - Food Insecurity Present (8/20/2024)    Hunger Vital Sign     Worried About Running Out of Food in the Last Year: Sometimes true     Ran Out of Food in the Last Year: Sometimes true   Transportation Needs: No Transportation Needs (11/5/2024)    [facility] from [facility] and Its Subsidiaries and Affiliates    PRAPARE - Transportation     Lack of Transportation (Medical): No     Lack of Transportation (Non-Medical): No   Physical Activity: Insufficiently Active (8/20/2024)    Exercise Vital Sign     Days of Exercise per Week: 1 day     Minutes of Exercise per Session: 20 min   Stress: Stress Concern Present (8/20/2024)    [employer] - Occupational Stress Questionnaire     Feeling of Stress : Very much   Housing Stability: Low Risk  (11/5/2024)    Received from [facility] and Its Subsidiaries and Affiliates    Housing Stability Vital Sign     Unable to Pay for Housing in the Last Year: No     Number of Times Moved in the Last Year: 0     Homeless in the Last Year: No   Recent Concern: Housing Stability - High Risk (8/20/2024)    Housing Stability Vital Sign     Unable to Pay for Housing in the Last Year: Yes     Homeless in the Last Year: No       Allergies:  Review of patient's allergies indicates:   Allergen Reactions    Codeine Shortness Of Breath     Pt denies codeine allergy    Shellfish derived Rash     States eats crawfish       Medications:    Current Outpatient Medications:     albuterol (PROVENTIL/VENTOLIN HFA) 90 mcg/actuation inhaler, Inhale 2 puffs into the lungs every 6 (six) hours as needed for Wheezing. Rescue, Disp: 8 g, Rfl: 1    albuterol-ipratropium (DUO-NEB) 2.5 mg-0.5 mg/3 mL nebulizer  solution, Take 3 mLs by nebulization every 6 (six) hours as needed for Wheezing. Rescue, Disp: 75 mL, Rfl: 2    ascorbic acid, vitamin C, (VITAMIN C) 1000 MG tablet, Take 1,000 mg by mouth once daily., Disp: , Rfl:     atogepant (QULIPTA) 60 mg Tab, Take 60 mg by mouth once daily. (Patient not taking: Reported on 11/13/2024), Disp: 30 tablet, Rfl: 11    cholecalciferol, vitamin D3, 1,250 mcg (50,000 unit) capsule, Take 1 capsule (50,000 Units total) by mouth every 7 days. (Patient not taking: Reported on 11/13/2024), Disp: 12 capsule, Rfl: 0    cloNIDine (CATAPRES) 0.2 MG tablet, Take 1 tablet (0.2 mg total) by mouth every evening., Disp: 30 tablet, Rfl: 6    empagliflozin (JARDIANCE) 10 mg tablet, Take 1 tablet (10 mg total) by mouth once daily., Disp: 90 tablet, Rfl: 3    furosemide (LASIX) 40 MG tablet, Take 1 tablet (40 mg total) by mouth once daily., Disp: 90 tablet, Rfl: 3    guaiFENesin-codeine 100-10 mg/5 ml (TUSSI-ORGANIDIN NR)  mg/5 mL syrup, Take 5 mLs by mouth every 6 (six) hours as needed for Cough., Disp: 237 mL, Rfl: 0    ketorolac (TORADOL) 10 mg tablet, Take 10 mg by mouth every 12 (twelve) hours as needed., Disp: , Rfl:     meclizine (ANTIVERT) 25 mg tablet, Take 1 tablet (25 mg total) by mouth 3 (three) times daily as needed for Dizziness., Disp: 30 tablet, Rfl: 4    methylPREDNISolone (MEDROL DOSEPACK) 4 mg tablet, use as directed (Patient not taking: Reported on 11/13/2024), Disp: 21 each, Rfl: 0    metoprolol succinate (TOPROL-XL) 25 MG 24 hr tablet, Take 1 tablet (25 mg total) by mouth once daily. (Patient not taking: Reported on 11/13/2024), Disp: 90 tablet, Rfl: 3    metoprolol tartrate (LOPRESSOR) 25 MG tablet, Take 25 mg by mouth., Disp: , Rfl:     nitroGLYCERIN (NITROSTAT) 0.4 MG SL tablet, Place 1 tablet (0.4 mg total) under the tongue every 5 (five) minutes as needed for Chest pain., Disp: 30 tablet, Rfl: 1    omeprazole (PRILOSEC) 40 MG capsule, Take 1 capsule (40 mg total) by  "mouth once daily., Disp: 90 capsule, Rfl: 1    ondansetron (ZOFRAN) 4 MG tablet, Take 1 tablet (4 mg total) by mouth every 12 (twelve) hours as needed for Nausea., Disp: 20 tablet, Rfl: 0    polyethylene glycol (MOVIPREP) 100-7.5-2.691 gram solution, Take as directed prior to colonoscopy (Patient not taking: Reported on 2024), Disp: 1 kit, Rfl: 0    sacubitriL-valsartan (ENTRESTO) 49-51 mg per tablet, Take 1 tablet by mouth 2 (two) times daily., Disp: 180 tablet, Rfl: 3    spironolactone (ALDACTONE) 25 MG tablet, Take 1 tablet (25 mg total) by mouth once daily., Disp: 30 tablet, Rfl: 11    valACYclovir (VALTREX) 500 MG tablet, Take 1 tablet (500 mg total) by mouth once daily., Disp: 90 tablet, Rfl: 1    Current Facility-Administered Medications:     albuterol nebulizer solution 2.5 mg, 2.5 mg, Nebulization, Q4H PRN, Duplechain, Manisha C, FNP, 2.5 mg at 24 0831    Review of Systems:  Negative unless noted in HPI.    Objective:     Vitals:    24 0836   BP: 128/76   Pulse: 70   Resp: 12   Temp: 98.9 °F (37.2 °C)   SpO2: 100%   Weight: 125.6 kg (277 lb)   Height: 5' 4" (1.626 m)     Body mass index is 47.55 kg/m².    PHYSICAL EXAM:  GEN: NAD, A&O x3.    Assessment:     54 y.o.  for rrBSO given BRCA2 mutation status, however needing updated cardiology clearance and evaluation of new pulmonary lesions prior to surgery given recent admission last week for chest pain.    1. BRCA2 gene mutation positive          Plan:     Follow up cardiology appointment and pulmonary evaluation prior to proceeding. Referral to pulmonology placed by her PCP today.  Cancel surgery at this time, will re-schedule once cleared.    Discussed patient and plan with Dr. Egan.    Keanu Guerrero MD  [employer] [facility], PGY-3  "

## 2024-11-13 ENCOUNTER — OFFICE VISIT (OUTPATIENT)
Dept: GYNECOLOGY | Facility: CLINIC | Age: 54
End: 2024-11-13
Payer: COMMERCIAL

## 2024-11-13 ENCOUNTER — OFFICE VISIT (OUTPATIENT)
Dept: INTERNAL MEDICINE | Facility: CLINIC | Age: 54
End: 2024-11-13
Payer: COMMERCIAL

## 2024-11-13 VITALS
SYSTOLIC BLOOD PRESSURE: 128 MMHG | BODY MASS INDEX: 47.29 KG/M2 | HEIGHT: 64 IN | RESPIRATION RATE: 12 BRPM | TEMPERATURE: 99 F | OXYGEN SATURATION: 100 % | WEIGHT: 277 LBS | DIASTOLIC BLOOD PRESSURE: 76 MMHG | HEART RATE: 70 BPM

## 2024-11-13 VITALS
DIASTOLIC BLOOD PRESSURE: 83 MMHG | SYSTOLIC BLOOD PRESSURE: 135 MMHG | TEMPERATURE: 98 F | BODY MASS INDEX: 47.41 KG/M2 | HEIGHT: 64 IN | OXYGEN SATURATION: 100 % | HEART RATE: 74 BPM | WEIGHT: 277.69 LBS | RESPIRATION RATE: 18 BRPM

## 2024-11-13 DIAGNOSIS — Z15.09 BRCA2 GENE MUTATION POSITIVE: Primary | ICD-10-CM

## 2024-11-13 DIAGNOSIS — R05.9 COUGH, UNSPECIFIED TYPE: ICD-10-CM

## 2024-11-13 DIAGNOSIS — J98.4 PULMONARY LESION: ICD-10-CM

## 2024-11-13 DIAGNOSIS — R91.1 SOLITARY PULMONARY NODULE: ICD-10-CM

## 2024-11-13 DIAGNOSIS — Z15.01 BRCA2 GENE MUTATION POSITIVE: Primary | ICD-10-CM

## 2024-11-13 PROCEDURE — 99213 OFFICE O/P EST LOW 20 MIN: CPT | Mod: PBBFAC,27

## 2024-11-13 PROCEDURE — 99214 OFFICE O/P EST MOD 30 MIN: CPT | Mod: S$PBB,,, | Performed by: NURSE PRACTITIONER

## 2024-11-13 PROCEDURE — 99215 OFFICE O/P EST HI 40 MIN: CPT | Mod: PBBFAC | Performed by: NURSE PRACTITIONER

## 2024-11-13 RX ORDER — ONDANSETRON 4 MG/1
4 TABLET, FILM COATED ORAL EVERY 12 HOURS PRN
Qty: 20 TABLET | Refills: 0 | Status: SHIPPED | OUTPATIENT
Start: 2024-11-13

## 2024-11-13 RX ORDER — KETOROLAC TROMETHAMINE 10 MG/1
10 TABLET, FILM COATED ORAL EVERY 12 HOURS PRN
COMMUNITY
Start: 2024-11-07

## 2024-11-13 RX ORDER — CODEINE PHOSPHATE AND GUAIFENESIN 10; 100 MG/5ML; MG/5ML
5 SOLUTION ORAL EVERY 6 HOURS PRN
Qty: 237 ML | Refills: 0 | Status: SHIPPED | OUTPATIENT
Start: 2024-11-13 | End: 2024-11-23

## 2024-11-13 RX ORDER — MECLIZINE HYDROCHLORIDE 25 MG/1
25 TABLET ORAL 3 TIMES DAILY PRN
Qty: 30 TABLET | Refills: 4 | Status: SHIPPED | OUTPATIENT
Start: 2024-11-13

## 2024-11-13 RX ORDER — METOPROLOL TARTRATE 25 MG/1
25 TABLET, FILM COATED ORAL
COMMUNITY
Start: 2024-11-07 | End: 2024-11-15

## 2024-11-13 NOTE — PROGRESS NOTES
Internal Medicine Clinic  MARY Cunha     Patient Name: Saeid Cheema   : 1970  MRN:[id number]     Chief Complaint     Chief Complaint   Patient presents with    Follow-up    Chest Pain    Cough     Hospital f/u- Chest pain and cough         History of Present Illness     54 year old AAF, presents in clinic for hosp f/u; patient admitted [facility] last week 2024 (discharged 2024) for chest pain and noted to have new pulmonary lesion that has not been addressed yet. Repeat TTE was done with some abnormal findings but patient ultimately discharged in stable condition with plan for close follow up with her cardiologist at [facility] which she will see in 2 days. She had a mildly elevated troponin at 0.04 which was trended x 2 and did not change.  Her CTA was negative for PE; displayed a 1.7 cm groundglass focus in the right lower lobe, new from prior exam. Consider 6-12 month follow-up per Fleischner criteria.   Repeat echocardiogram was performed.  Cardiology was consulted.  Recommendations to continue with current medical management.  She will need to follow-up with [facility] cardiology clinic outpatient.  Her pain seems to have improved quite substantially after treatment with hydrocodone and Toradol.   c/o nonproductive cough for >1 month; Afebrile.    PMH HTN, HLD, prediabetes, CAD, HFrEF, left breast cancer 10/7/2022, left lower lobe lung nodule, GERD, migraines, genital herpes, IC, hematuria, chlamydia pneumonia 3/2022, BV. Nonsmoker. PMH TIA, and small cerebral aneurysm in . 2021; MR angio head and MRI brain without acute findings. EEG 2021; normal.   Following Dr. Shankar [facility] for migraines. He is trying to get her approved for BOTOX injections for migraine prevention. Followed by [facility] G[facility] and [facility] U[facility] clinic for further workup of hematuria; cystoscopy 2020 displayed Microscopic hematuria, Squamous metaplasia. Status post bilateral mastectomy 2022 [facility] with the large  "open wound in the right chest. Seroma in the region of the left lateral chest wall. Wound are at [facility] per Dr. Rowley. [facility] [address], Dr. Herron and [facility].   She is on valacyclovir 500mg daily for genital herpes-suppression; voices 3 outbreaks per yr, in which she doubles her med for 5 days during an outbreak.   PFT 8/10/2022 moderate restriction, no bronchodilator response. Normal lung volumes. Although she is requesting refill on albuterol as she states she feels it helps her breathing.  PFT 10/26/23 WNL  States her  passed away of cardiac complications 4/1/2022. Lives with 15 year old grandson.   Denies CP, SOB, cough, fever, dizziness, weakness, abdominal pain, nausea, vomiting, diarrhea, constipation, depression, SI, HI.  Works at [employer]  Colonoscopy with polyps, Dr. Richardson 2021; told she needed to repeat scope but states she could not afford the repeat scope.     She has FMLA intermittent leave paperwork for h/o migraines.         PFT 11-4-2024  Patient struggled with coughing throughout testing.  Albuterol 2.5 mg given HR 77/74, SAT 97%, BBS diminshed  Normal spirometry. No bronchodilator response. Lung volumes indicate mild restriction. Diffusion capacity is mildly  impaired.          Review of Systems     Review of Systems   Constitutional: Negative.    HENT: Negative.     Eyes: Negative.    Respiratory:  Positive for cough and wheezing.    Cardiovascular: Negative.    Gastrointestinal: Negative.    Endocrine: Negative.    Genitourinary: Negative.    Musculoskeletal: Negative.    Integumentary:  Negative.   Allergic/Immunologic: Negative.    Neurological: Negative.    Hematological: Negative.    Psychiatric/Behavioral: Negative.     All other systems reviewed and are negative.       Physical Examination     Visit Vitals  /83   Pulse 74   Temp 98.1 °F (36.7 °C) (Oral)   Resp 18   Ht 5' 4" (1.626 m)   Wt 126 kg (277 lb 11.2 oz)   SpO2 " 100%   BMI 47.67 kg/m²        BP Readings from Last 6 Encounters:   11/13/24 135/83   10/21/24 (!) 148/78   10/07/24 139/79   09/03/24 (!) 152/82   08/28/24 130/79   08/21/24 117/66   ]    Wt Readings from Last 6 Encounters:   11/13/24 126 kg (277 lb 11.2 oz)   10/21/24 125.2 kg (276 lb)   10/07/24 122.9 kg (271 lb)   09/03/24 123.9 kg (273 lb 2.4 oz)   08/28/24 124.5 kg (274 lb 6.4 oz)   08/21/24 124.3 kg (274 lb)   ]    BMI Readings from Last 3 Encounters:   11/13/24 47.67 kg/m²   10/21/24 47.38 kg/m²   10/07/24 46.52 kg/m²         Physical Exam  Vitals and nursing note reviewed.   Constitutional:       Appearance: Normal appearance.   HENT:      Head: Normocephalic and atraumatic.      Right Ear: Tympanic membrane, ear canal and external ear normal.      Left Ear: Tympanic membrane, ear canal and external ear normal.      Nose: Nose normal.      Mouth/Throat:      Mouth: Mucous membranes are moist.      Pharynx: Oropharynx is clear.   Eyes:      Extraocular Movements: Extraocular movements intact.      Conjunctiva/sclera: Conjunctivae normal.      Pupils: Pupils are equal, round, and reactive to light.   Cardiovascular:      Rate and Rhythm: Normal rate and regular rhythm.      Pulses: Normal pulses.      Heart sounds: Normal heart sounds.   Pulmonary:      Effort: Pulmonary effort is normal.      Breath sounds: Normal breath sounds.   Abdominal:      General: Abdomen is flat. Bowel sounds are normal.      Palpations: Abdomen is soft.   Musculoskeletal:         General: Normal range of motion.      Cervical back: Normal range of motion and neck supple.   Skin:     General: Skin is warm and dry.      Capillary Refill: Capillary refill takes less than 2 seconds.   Neurological:      General: No focal deficit present.      Mental Status: She is alert and oriented to person, place, and time. Mental status is at baseline.   Psychiatric:         Mood and Affect: Mood normal.         Behavior: Behavior normal.          Thought Content: Thought content normal.         Judgment: Judgment normal.          Labs / Imaging     Chemistry:  Lab Results   Component Value Date     11/06/2024    K 3.9 11/06/2024    BUN 15 11/06/2024    CREATININE 0.59 11/06/2024    EGFRNORACEVR >60 08/28/2024    GLUCOSE 59 (L) 08/28/2024    CALCIUM 9.2 11/06/2024    ALKPHOS 96 08/28/2024    LABPROT 7.2 08/28/2024    ALBUMIN 3.4 (L) 08/28/2024    BILIDIR 0.1 07/13/2022    IBILI 0.10 01/25/2022    AST 15 08/28/2024    ALT 12 08/28/2024    MG 1.90 06/04/2024    FOBSCKBS69VZ 49 08/20/2024        Lab Results   Component Value Date    HGBA1C 5.9 08/28/2024        Hematology:  Lab Results   Component Value Date    WBC 7.05 08/20/2024    RBC 5.40 08/20/2024    HGB 12.3 08/20/2024    HCT 40.2 08/20/2024    MCV 74.4 (L) 08/20/2024    MCH 22.8 (L) 08/20/2024    MCHC 30.6 (L) 08/20/2024    RDW 15.9 08/20/2024     08/20/2024    MPV 12.6 (H) 08/20/2024        Lipid Panel:  Lab Results   Component Value Date    CHOL 175 08/20/2024    HDL 46 08/20/2024    .00 08/20/2024    TRIG 91 08/20/2024    TOTALCHOLEST 4 08/20/2024        Urine:  Lab Results   Component Value Date    APPEARANCEUA Clear 08/20/2024    SGUA 1.029 08/20/2024    PROTEINUA Trace (A) 08/20/2024    KETONESUA Negative 08/20/2024    LEUKOCYTESUR Negative 08/20/2024    RBCUA 0-5 08/20/2024    WBCUA 0-5 08/20/2024    BACTERIA Trace (A) 08/20/2024    SQEPUA Moderate (A) 08/20/2024    HYALINECASTS 0-2 (A) 08/20/2024    CREATRANDUR 222.0 02/19/2018      CT Angiogram Pulmonary     Result Date: 11/5/2024  EXAM: CTA Chest with Contrast DATE: 11/5/2024 11:58 AM COMPARISON: 1/20/2023 INDICATION: 54 years Female with Pulmonary embolism (PE) suspected,  unknown D-dimer TECHNIQUE: Contiguous thin section axial CT images of the chest were obtained after administration of IV contrast.  MIP images of the pulmonary vasculature are provided in axial, coronal, and sagittal projections from a separate work  station (PE protocol).  All CTs performed at this institution utilize dose modulation and/or weight-based dose reduction when appropriate to reduce radiation dose to the patient as low as reasonably achievable FINDINGS: The heart appears enlarged. Unchanged 5 mm left lobe pulmonary nodule. 1.7 cm groundglass focus in the right lower lobe is new from prior exam. Pulmonary angiography demonstrates no filling defects. The visualized vasculature is within normal limits. Moderate coronary artery calcifications are present. The osseous structures are intact. Small hiatal hernia.      No pulmonary emboli. 1.7 cm groundglass focus in the right lower lobe, new from prior exam. Consider 6-12 month follow-up per Fleischner criteria.        Assessment       ICD-10-CM ICD-9-CM   1. Solitary pulmonary nodule  R91.1 793.11   2. Cough, unspecified type  R05.9 786.2   3. BMI 45.0-49.9, adult  Z68.42 V85.42        Plan     1. Solitary pulmonary nodule  Refer to [facility] PULM clinic  1.7 cm groundglass focus in the right lower lobe, new from prior exam. Consider 6-12 month follow-up per Fleischner criteria.   - Ambulatory referral/consult to Pulmonology; Future    2. Cough, unspecified type  Fluids, rest  DUO NEBS prn  Keep cardio apt this week  ED precautions for SOB, fever, persistent or new CP  - guaiFENesin-codeine 100-10 mg/5 ml (TUSSI-ORGANIDIN NR)  mg/5 mL syrup; Take 5 mLs by mouth every 6 (six) hours as needed for Cough.  Dispense: 237 mL; Refill: 0    3. BMI 45.0-49.9, adult  Goal BMI <30.  Exercise 5 times a week for 30 minutes per day.  Avoid soda, simple sugars, excessive rice, potatoes or bread. Limit fast foods and fried foods.  Choose complex carbs in moderation (example: green vegetables, beans, oatmeal). Eat plenty of fresh fruits and vegetables with lean meats daily.  Do not skip meals. Eat a balanced portion size.  Avoid fad diets. Consider permanent healthy life style changes.           Current Outpatient  Medications   Medication Instructions    albuterol (PROVENTIL/VENTOLIN HFA) 90 mcg/actuation inhaler 2 puffs, Inhalation, Every 6 hours PRN, Rescue    albuterol-ipratropium (DUO-NEB) 2.5 mg-0.5 mg/3 mL nebulizer solution 3 mLs, Nebulization, Every 6 hours PRN, Rescue    ascorbic acid (vitamin C) (VITAMIN C) 1,000 mg, Daily    cholecalciferol (vitamin D3) 50,000 Units, Oral, Every 7 days    cloNIDine (CATAPRES) 0.2 mg, Oral, Nightly    empagliflozin (JARDIANCE) 10 mg, Oral, Daily    furosemide (LASIX) 40 mg, Oral, Daily    guaiFENesin-codeine 100-10 mg/5 ml (TUSSI-ORGANIDIN NR)  mg/5 mL syrup 5 mLs, Oral, Every 6 hours PRN    ketorolac (TORADOL) 10 mg, Every 12 hours PRN    meclizine (ANTIVERT) 25 mg, Oral, 3 times daily PRN    methylPREDNISolone (MEDROL DOSEPACK) 4 mg tablet use as directed    metoprolol succinate (TOPROL-XL) 25 mg, Oral, Daily    metoprolol tartrate (LOPRESSOR) 25 mg    nitroGLYCERIN (NITROSTAT) 0.4 mg, Sublingual, Every 5 min PRN    omeprazole (PRILOSEC) 40 mg, Oral, Daily    ondansetron (ZOFRAN) 4 mg, Oral, Every 12 hours PRN    polyethylene glycol (MOVIPREP) 100-7.5-2.691 gram solution Take as directed prior to colonoscopy    QULIPTA 60 mg, Oral, Daily    sacubitriL-valsartan (ENTRESTO) 49-51 mg per tablet 1 tablet, Oral, 2 times daily    spironolactone (ALDACTONE) 25 mg, Oral, Daily    valACYclovir (VALTREX) 500 mg, Oral, Daily       Orders Placed This Encounter   Procedures    Ambulatory referral/consult to Pulmonology         Future Appointments   Date Time Provider Department Center   11/15/2024  8:00 AM Pankaj Singh MD [facility] [place]    12/3/2024 10:20 AM Manisha Guillermo FNP [facility] [place]    12/11/2024  7:00 AM [facility] ECHO 01 [facility] E[facility] [place]   1/9/2025 11:40 AM Comfort Naranjo MD [facility] [place]   1/21/2025  7:00 AM Manisha Guillermo FNP [facility] [place]        Labs thoroughly reviewed with patient. Medication refills addressed  today.  RTC prn and 1 months, with labs 1 week prior to the apt.  COVID 19 precautions given to patient.  Patient voices understanding of all discharge instructions.      MARY Cunha

## 2024-11-14 ENCOUNTER — TELEPHONE (OUTPATIENT)
Dept: INTERNAL MEDICINE | Facility: CLINIC | Age: 54
End: 2024-11-14
Payer: COMMERCIAL

## 2024-11-14 NOTE — TELEPHONE ENCOUNTER
----- Message from Lupe sent at 11/14/2024  9:08 AM CST -----  Regarding: request  Who Called: Saeid Cheema    Caller is requesting assistance/information from provider's office.    Symptoms (please be specific):      How long has patient had these symptoms:      List of preferred pharmacies on file (remove unneeded): [unfilled]  If different, enter pharmacy into here including location and phone number:         Preferred Method of Contact: Phone Call  Patient's Preferred Phone Number on File: [phone]   Best Call Back Number, if different:  Additional Information: Pt is requesting to speak w/ Cleo regarding her office visit on yesterday; please advise. Thanks.

## 2024-11-14 NOTE — TELEPHONE ENCOUNTER
Spoke with patient she informed me she needs paper work filled  to give to her job covering her for the time she was out of work with 12/4/24 as expected return date and if date need to be extended after visit on 12/3/24 with PCP additional paper work will be needed.Informed will discuss with  PCP and call her when paper work is ready for pickup

## 2024-11-15 ENCOUNTER — LAB VISIT (OUTPATIENT)
Dept: LAB | Facility: HOSPITAL | Age: 54
End: 2024-11-15
Attending: NURSE PRACTITIONER
Payer: COMMERCIAL

## 2024-11-15 ENCOUNTER — OFFICE VISIT (OUTPATIENT)
Dept: CARDIOLOGY | Facility: CLINIC | Age: 54
End: 2024-11-15
Payer: COMMERCIAL

## 2024-11-15 VITALS
OXYGEN SATURATION: 98 % | SYSTOLIC BLOOD PRESSURE: 150 MMHG | DIASTOLIC BLOOD PRESSURE: 80 MMHG | WEIGHT: 279.19 LBS | HEIGHT: 64 IN | TEMPERATURE: 99 F | RESPIRATION RATE: 18 BRPM | HEART RATE: 73 BPM | BODY MASS INDEX: 47.66 KG/M2

## 2024-11-15 DIAGNOSIS — I10 PRIMARY HYPERTENSION: Primary | ICD-10-CM

## 2024-11-15 DIAGNOSIS — D05.12 DUCTAL CARCINOMA IN SITU (DCIS) OF LEFT BREAST: ICD-10-CM

## 2024-11-15 DIAGNOSIS — R73.03 PREDIABETES: ICD-10-CM

## 2024-11-15 DIAGNOSIS — I10 PRIMARY HYPERTENSION: ICD-10-CM

## 2024-11-15 DIAGNOSIS — I50.9 CONGESTIVE HEART FAILURE, UNSPECIFIED HF CHRONICITY, UNSPECIFIED HEART FAILURE TYPE: ICD-10-CM

## 2024-11-15 DIAGNOSIS — E55.9 VITAMIN D DEFICIENCY: ICD-10-CM

## 2024-11-15 LAB
25(OH)D3+25(OH)D2 SERPL-MCNC: 37 NG/ML (ref 30–80)
ALBUMIN SERPL-MCNC: 3.5 G/DL (ref 3.5–5)
ALBUMIN/GLOB SERPL: 0.9 RATIO (ref 1.1–2)
ALP SERPL-CCNC: 103 UNIT/L (ref 40–150)
ALT SERPL-CCNC: 17 UNIT/L (ref 0–55)
ANION GAP SERPL CALC-SCNC: 8 MEQ/L
AST SERPL-CCNC: 16 UNIT/L (ref 5–34)
BASOPHILS # BLD AUTO: 0.02 X10(3)/MCL
BASOPHILS NFR BLD AUTO: 0.3 %
BILIRUB SERPL-MCNC: 0.3 MG/DL
BUN SERPL-MCNC: 12.3 MG/DL (ref 9.8–20.1)
CALCIUM SERPL-MCNC: 9.5 MG/DL (ref 8.4–10.2)
CHLORIDE SERPL-SCNC: 107 MMOL/L (ref 98–107)
CHOLEST SERPL-MCNC: 169 MG/DL
CHOLEST/HDLC SERPL: 4 {RATIO} (ref 0–5)
CO2 SERPL-SCNC: 26 MMOL/L (ref 22–29)
CREAT SERPL-MCNC: 0.69 MG/DL (ref 0.55–1.02)
CREAT/UREA NIT SERPL: 18
EOSINOPHIL # BLD AUTO: 0.27 X10(3)/MCL (ref 0–0.9)
EOSINOPHIL NFR BLD AUTO: 4.1 %
ERYTHROCYTE [DISTWIDTH] IN BLOOD BY AUTOMATED COUNT: 16.1 % (ref 11.5–17)
EST. AVERAGE GLUCOSE BLD GHB EST-MCNC: 122.6 MG/DL
GFR SERPLBLD CREATININE-BSD FMLA CKD-EPI: >60 ML/MIN/1.73/M2
GLOBULIN SER-MCNC: 4.1 GM/DL (ref 2.4–3.5)
GLUCOSE SERPL-MCNC: 94 MG/DL (ref 74–100)
HBA1C MFR BLD: 5.9 %
HCT VFR BLD AUTO: 39.1 % (ref 37–47)
HDLC SERPL-MCNC: 47 MG/DL (ref 35–60)
HGB BLD-MCNC: 12.2 G/DL (ref 12–16)
IMM GRANULOCYTES # BLD AUTO: 0.02 X10(3)/MCL (ref 0–0.04)
IMM GRANULOCYTES NFR BLD AUTO: 0.3 %
LDLC SERPL CALC-MCNC: 104 MG/DL (ref 50–140)
LYMPHOCYTES # BLD AUTO: 2.12 X10(3)/MCL (ref 0.6–4.6)
LYMPHOCYTES NFR BLD AUTO: 32.5 %
MCH RBC QN AUTO: 23.5 PG (ref 27–31)
MCHC RBC AUTO-ENTMCNC: 31.2 G/DL (ref 33–36)
MCV RBC AUTO: 75.2 FL (ref 80–94)
MONOCYTES # BLD AUTO: 0.67 X10(3)/MCL (ref 0.1–1.3)
MONOCYTES NFR BLD AUTO: 10.3 %
NEUTROPHILS # BLD AUTO: 3.43 X10(3)/MCL (ref 2.1–9.2)
NEUTROPHILS NFR BLD AUTO: 52.5 %
NRBC BLD AUTO-RTO: 0 %
PLATELET # BLD AUTO: 266 X10(3)/MCL (ref 130–400)
PMV BLD AUTO: 11.1 FL (ref 7.4–10.4)
POTASSIUM SERPL-SCNC: 3.9 MMOL/L (ref 3.5–5.1)
PROT SERPL-MCNC: 7.6 GM/DL (ref 6.4–8.3)
RBC # BLD AUTO: 5.2 X10(6)/MCL (ref 4.2–5.4)
SODIUM SERPL-SCNC: 141 MMOL/L (ref 136–145)
TRIGL SERPL-MCNC: 90 MG/DL (ref 37–140)
VLDLC SERPL CALC-MCNC: 18 MG/DL
WBC # BLD AUTO: 6.53 X10(3)/MCL (ref 4.5–11.5)

## 2024-11-15 PROCEDURE — 85025 COMPLETE CBC W/AUTO DIFF WBC: CPT

## 2024-11-15 PROCEDURE — 80053 COMPREHEN METABOLIC PANEL: CPT

## 2024-11-15 PROCEDURE — 36415 COLL VENOUS BLD VENIPUNCTURE: CPT

## 2024-11-15 PROCEDURE — 80061 LIPID PANEL: CPT

## 2024-11-15 PROCEDURE — 99215 OFFICE O/P EST HI 40 MIN: CPT | Mod: PBBFAC | Performed by: INTERNAL MEDICINE

## 2024-11-15 PROCEDURE — 82306 VITAMIN D 25 HYDROXY: CPT

## 2024-11-15 PROCEDURE — 83036 HEMOGLOBIN GLYCOSYLATED A1C: CPT

## 2024-11-15 NOTE — PATIENT INSTRUCTIONS
Follow  up in 2 months or sooner if needed     Restart entresto as ordered     Nurse visit scheduled

## 2024-11-15 NOTE — TELEPHONE ENCOUNTER
Attempted to contact pt to inform her PCP was unable to complete paperwork at this time and inform pt she will be notified once paperwork is complete . Pt did not answer. LVM to return call to [facility].

## 2024-11-15 NOTE — PROGRESS NOTES
11/15/2024 2:07 PM    Subjective:     CHIEF COMPLAINT:  Dyspnea on exertion                        HPI:    Ms. Saeid Cheema is a 54 y.o. 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     The patient denes any CP, SOB. She reports DUARTE with >2 blocks. She is able to mop, sweep, vacuum without any issues. Reports she is able to perform all ADLs. Only DUARTE with moderate to extreme physical activity. She is currently only on metoprolol succinate 25. She states she was taken off rest of GDMT d/t  lower blood pressure when she presented to [facility] for chest pain diagnosed as costochondritis. Today /111.     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    Pelvic pain    ACC/AHA stage C heart failure with reduced ejection fraction   Surgical History    Past Surgical History:   Procedure Laterality Date    ANGIOGRAM, CORONARY, WITH LEFT HEART CATHETERIZATION N/A 07/29/2024    Procedure: Angiogram, Coronary, with Left Heart Cath;  Surgeon: Pankaj Singh MD;  Location: [facility] CATH LAB;  Service: Cardiology;  Laterality: N/A;    BREAST SURGERY  11/30/2022    COMPUTED TOMOGRAPHY ANGIOGRAM  09/07/2012    MASTECTOMY Bilateral 11/30/2022    PARTIAL HYSTERECTOMY  07/13/2023   Social History    Social History     Socioeconomic History    Marital status:     Number of children: 5   Occupational History    Occupation: Employed   Tobacco Use    Smoking status: Never     Passive  exposure: Never    Smokeless tobacco: Never   Substance and Sexual Activity    Alcohol use: Not Currently     Comment: Occasionally    Drug use: Never    Sexual activity: Not Currently     Partners: Male     Birth control/protection: Post-menopausal     Social Drivers of Health     Financial Resource Strain: Low Risk  (11/5/2024)    [facility]    Overall Financial Resource Strain (CARDIA)     Difficulty of Paying Living Expenses: Not hard at all   Recent Concern: Financial Resource Strain - Medium Risk (8/20/2024)    Overall Financial Resource Strain (CARDIA)     Difficulty of Paying Living Expenses: Somewhat hard   Food Insecurity: No Food Insecurity (11/5/2024)    [facility]    Hunger Vital Sign     Worried About Running Out of Food in the Last Year: Never true     Ran Out of Food in the Last Year: Never true   Recent Concern: Food Insecurity - Food Insecurity Present (8/20/2024)    Hunger Vital Sign     Worried About Running Out of Food in the Last Year: Sometimes true     Ran Out of Food in the Last Year: Sometimes true   Transportation Needs: No Transportation Needs (11/5/2024)    [facility]    PRAPARE - Transportation     Lack of Transportation (Medical): No     Lack of Transportation (Non-Medical): No   Physical Activity: Insufficiently Active (8/20/2024)    Exercise Vital Sign     Days of Exercise per Week: 1 day     Minutes of Exercise per Session: 20 min   Stress: Stress Concern Present (8/20/2024)    [employer] [place] [employer] - Occupational Stress Questionnaire     Feeling of Stress : Very much   Housing Stability: Low Risk  (11/5/2024)    [facility]    Housing Stability Vital Sign     Unable to Pay for Housing in the Last Year: No     Number of Times Moved in the Last Year: 0     Homeless in the Last Year: No   Recent Concern: Housing Stability - High Risk (8/20/2024)    Housing Stability Vital Sign     Unable to Pay for Housing in the Last Year: Yes     Homeless in the Last Year: No   Family History    Family History   Problem Relation Name Age of Onset    Hypertension Mother Carine Velazquez     Heart disease Mother Carine Velazquez     Asthma Mother Carine Velazquez     Hypertension Father Pankaj Malhotra     Heart disease Father Pankaj Malhotra     Ovarian cancer Maternal Grandmother      Breast cancer Maternal Grandmother      Breast cancer Paternal Grandmother      Cervical cancer Daughter  32   Allergy    Review of patient's allergies indicates:   Allergen Reactions    Shellfish derived Rash     States eats crawfish   Current Medications    Current Outpatient Medications:     albuterol (PROVENTIL/VENTOLIN HFA) 90 mcg/actuation inhaler, Inhale 2 puffs into the lungs every 6 (six) hours as needed for Wheezing. Rescue, Disp: 8 g, Rfl: 1    albuterol-ipratropium (DUO-NEB) 2.5 mg-0.5 mg/3 mL nebulizer solution, Take 3 mLs by nebulization every 6 (six) hours as needed for Wheezing. Rescue, Disp: 75 mL, Rfl: 2    ascorbic acid, vitamin C, (VITAMIN C) 1000 MG tablet, Take 1,000 mg by mouth once daily., Disp: , Rfl:     empagliflozin (JARDIANCE) 10 mg tablet, Take 1 tablet (10 mg total) by mouth once daily., Disp: 90 tablet, Rfl: 3    furosemide (LASIX) 40 MG tablet, Take 1 tablet (40 mg total) by mouth once daily., Disp: 90 tablet, Rfl: 3    guaiFENesin-codeine 100-10 mg/5 ml (TUSSI-ORGANIDIN NR)  mg/5 mL syrup, Take 5 mLs by mouth every 6 (six) hours as needed for Cough., Disp: 237 mL, Rfl: 0    ketorolac (TORADOL) 10 mg tablet, Take 10 mg by mouth every 12 (twelve) hours as needed., Disp: , Rfl:     meclizine (ANTIVERT) 25 mg tablet, Take 1 tablet (25 mg  total) by mouth 3 (three) times daily as needed for Dizziness., Disp: 30 tablet, Rfl: 4    metoprolol succinate (TOPROL-XL) 25 MG 24 hr tablet, Take 1 tablet (25 mg total) by mouth once daily., Disp: 90 tablet, Rfl: 3    nitroGLYCERIN (NITROSTAT) 0.4 MG SL tablet, Place 1 tablet (0.4 mg total) under the tongue every 5 (five) minutes as needed for Chest pain., Disp: 30 tablet, Rfl: 1    omeprazole (PRILOSEC) 40 MG capsule, Take 1 capsule (40 mg total) by mouth once daily., Disp: 90 capsule, Rfl: 1    ondansetron (ZOFRAN) 4 MG tablet, Take 1 tablet (4 mg total) by mouth every 12 (twelve) hours as needed for Nausea., Disp: 20 tablet, Rfl: 0    cloNIDine (CATAPRES) 0.2 MG tablet, Take 1 tablet (0.2 mg total) by mouth every evening. (Patient not taking: Reported on 11/15/2024), Disp: 30 tablet, Rfl: 6    polyethylene glycol (MOVIPREP) 100-7.5-2.691 gram solution, Take as directed prior to colonoscopy (Patient not taking: Reported on 10/21/2024), Disp: 1 kit, Rfl: 0    sacubitriL-valsartan (ENTRESTO) 49-51 mg per tablet, Take 1 tablet by mouth 2 (two) times daily. (Patient not taking: Reported on 11/15/2024), Disp: 180 tablet, Rfl: 3    spironolactone (ALDACTONE) 25 MG tablet, Take 1 tablet (25 mg total) by mouth once daily. (Patient not taking: Reported on 11/15/2024), Disp: 30 tablet, Rfl: 11    valACYclovir (VALTREX) 500 MG tablet, Take 1 tablet (500 mg total) by mouth once daily. (Patient not taking: Reported on 11/15/2024), Disp: 90 tablet, Rfl: 1  No current facility-administered medications for this visit.    ROS:                                                                                                                        Review of Systems   Constitutional:  Negative for chills and fever.   Respiratory:  Positive for shortness of breath. Negative for cough.    Cardiovascular:  Positive for leg swelling. Negative for chest pain and palpitations.   Gastrointestinal:  Negative for abdominal pain.  "  Genitourinary:  Negative for dysuria.     Objective:     PE:  Blood pressure (!) 156/111, pulse 73, temperature 98.6 °F (37 °C), temperature source Oral, resp. rate 18, height 5' 4" (1.626 m), weight 126.6 kg (279 lb 3.2 oz), SpO2 98%.   BP Readings from Last 3 Encounters:   11/15/24 (!) 156/111   11/13/24 135/83   11/13/24 128/76       Wt Readings from Last 3 Encounters:   11/15/24 126.6 kg (279 lb 3.2 oz)   11/13/24 126 kg (277 lb 11.2 oz)   11/13/24 125.6 kg (277 lb)     BMI 47.92 kg/m^2    Physical Exam  Constitutional:       Appearance: Normal appearance. She is obese.   HENT:      Head: Normocephalic and atraumatic.   Cardiovascular:      Rate and Rhythm: Normal rate and regular rhythm.      Pulses: Normal pulses.      Heart sounds: No murmur heard.  Pulmonary:      Effort: No respiratory distress.      Breath sounds: Normal breath sounds. No wheezing.   Abdominal:      Palpations: Abdomen is soft.      Tenderness: There is no abdominal tenderness.   Musculoskeletal:      Right lower leg: No edema.      Left lower leg: No edema.   Neurological:      General: No focal deficit present.      Mental Status: She is alert and oriented to person, place, and time.                                                                                                                                                                                                                                                                                                                                                                                                                                                               CARDIAC TESTING:  EKG  No results found for this or any previous visit.  Echocardiogram  No results found for this or any previous visit.    Stress Test  No results found for this or any previous visit.     Coronary Angiogram  No results found for this or any previous visit.      Last [facility]  BMP  Lab Results   Component " "Value Date     11/06/2024    K 3.9 11/06/2024     11/06/2024    CO2 26 11/06/2024    BUN 15 11/06/2024    CREATININE 0.59 11/06/2024    CALCIUM 9.2 11/06/2024    ANIONGAP 9 11/06/2024    EGFRNORACEVR >60 08/28/2024   Last CBC     Lab Results   Component Value Date    WBC 7.05 08/20/2024    HGB 12.3 08/20/2024    HCT 40.2 08/20/2024    MCV 74.4 (L) 08/20/2024     08/20/2024   Last lipids    Lab Results   Component Value Date    CHOL 175 08/20/2024    CHOL 166 04/12/2024    CHOL 172 12/13/2023     Lab Results   Component Value Date    HDL 46 08/20/2024    HDL 45 04/12/2024    HDL 45 12/13/2023     No results found for: "LDLCALC"  Lab Results   Component Value Date    TRIG 91 08/20/2024    TRIG 64 04/12/2024    TRIG 86 12/13/2023     No results found for: "CHOLHDL"    Assessment:     There are no diagnoses linked to this encounter.    Body mass index is 47.92 kg/m².      Last PCP visit:  11/13/2024    Plan:     ACC/AHA stage C heart failure with reduced ejection fraction  NICM  HTN  NYHA class 2-3 with LVEF 25-30% on recent echocardiogram with persistent dyspnea on exertion.  Prior coronary angiograms without evidence of obstructive CAD .  Repeat angio 7/2024 showed normal coronaries.  -Resume low dose Entresto and titrate up as tolerated  -Continue metoprolol succinate 25 mg p.o. daily  -Consider restarting empagliflozin 10 mg p.o. daily and aldactone at next visit pending bmp  -BP check 2 weeks    CVA  -Reports h/o CVA 2013  -She states she was taken off Aspirin and Statin by per primary.   -Recommend resuming ASA 81 and statin given h/o CVA, however, can resume after procedure.    Pre-Op cardiac risk stratification  Surgery: Hysterectomy, elective  METs: >4  RCRI: 3, Class III  Patient is at moderate cardiac risk for intermediate to high risk surgery        Future Appointments   Date Time Provider Department Center   12/3/2024 10:20 AM Manisha Guillermo, FNP [facility]   12/11/2024  " 7:00 AM [facility] ECHO 01 [facility] ECHO [place]   1/9/2025 11:40 AM Comfort Naranjo MD [facility] BSR [place]   1/21/2025  7:00 AM Manisha Guillermo, P [facility] INTMED [place]        Eulalio Haney MD  LS Cardiology Fellow

## 2024-11-15 NOTE — PROGRESS NOTES
"Cardiology Attending  11/15/2024 3:39 PM    Saeid Cheema was seen by the Cariology fellow for a clinic appointment.  I have discussed Saeid Cheema including the patient's symptoms, findings, and management plan with the cardiology fellow.  Ms. Saeid Cheema is a 54 y.o. female.  The patient is seen in cardiology clinic for HFrEF and preop risk assessment.      Blood pressure (!) 150/80, pulse 73, temperature 98.6 °F (37 °C), temperature source Oral, resp. rate 18, height 5' 4" (1.626 m), weight 126.6 kg (279 lb 3.2 oz), SpO2 98%.    Lab Results   Component Value Date    CHOL 169 11/15/2024      Lab Results   Component Value Date    HGB 12.2 11/15/2024      Lab Results   Component Value Date    CREATININE 0.69 11/15/2024      Lab Results   Component Value Date    BNP 11 (L) 11/05/2024          Plan is to resume low dose entresto   PREOP RISK ASSESSMENT:  The patient is moderate cardiac risk for intermediate to high risk surgery.     Pankaj Singh MD   "

## 2024-11-17 ENCOUNTER — HOSPITAL ENCOUNTER (EMERGENCY)
Facility: HOSPITAL | Age: 54
Discharge: HOME | End: 2024-11-17
Attending: INTERNAL MEDICINE
Payer: COMMERCIAL

## 2024-11-17 ENCOUNTER — OFFICE VISIT (OUTPATIENT)
Dept: URGENT CARE | Facility: CLINIC | Age: 54
End: 2024-11-17
Payer: COMMERCIAL

## 2024-11-17 VITALS
HEART RATE: 82 BPM | SYSTOLIC BLOOD PRESSURE: 166 MMHG | BODY MASS INDEX: 52.44 KG/M2 | RESPIRATION RATE: 25 BRPM | HEIGHT: 62 IN | DIASTOLIC BLOOD PRESSURE: 84 MMHG | OXYGEN SATURATION: 100 % | TEMPERATURE: 99 F | WEIGHT: 285 LBS

## 2024-11-17 VITALS
HEART RATE: 92 BPM | OXYGEN SATURATION: 98 % | RESPIRATION RATE: 20 BRPM | TEMPERATURE: 99 F | HEIGHT: 62 IN | SYSTOLIC BLOOD PRESSURE: 181 MMHG | DIASTOLIC BLOOD PRESSURE: 116 MMHG | WEIGHT: 279.75 LBS | BODY MASS INDEX: 51.48 KG/M2

## 2024-11-17 DIAGNOSIS — R07.81 PLEURITIC CHEST PAIN: Primary | ICD-10-CM

## 2024-11-17 DIAGNOSIS — M94.0 ACUTE COSTOCHONDRITIS: ICD-10-CM

## 2024-11-17 DIAGNOSIS — R07.9 CHEST PAIN: ICD-10-CM

## 2024-11-17 DIAGNOSIS — R07.89 ATYPICAL CHEST PAIN: Primary | ICD-10-CM

## 2024-11-17 DIAGNOSIS — I10 UNCONTROLLED HYPERTENSION: ICD-10-CM

## 2024-11-17 LAB
ALBUMIN SERPL-MCNC: 3.4 G/DL (ref 3.5–5)
ALBUMIN/GLOB SERPL: 0.9 RATIO (ref 1.1–2)
ALP SERPL-CCNC: 102 UNIT/L (ref 40–150)
ALT SERPL-CCNC: 14 UNIT/L (ref 0–55)
ANION GAP SERPL CALC-SCNC: 8 MEQ/L
AST SERPL-CCNC: 14 UNIT/L (ref 5–34)
BASOPHILS # BLD AUTO: 0.03 X10(3)/MCL
BASOPHILS NFR BLD AUTO: 0.4 %
BILIRUB SERPL-MCNC: 0.2 MG/DL
BNP BLD-MCNC: 51.2 PG/ML
BUN SERPL-MCNC: 19.6 MG/DL (ref 9.8–20.1)
CALCIUM SERPL-MCNC: 9.5 MG/DL (ref 8.4–10.2)
CHLORIDE SERPL-SCNC: 109 MMOL/L (ref 98–107)
CO2 SERPL-SCNC: 26 MMOL/L (ref 22–29)
CREAT SERPL-MCNC: 0.84 MG/DL (ref 0.55–1.02)
CREAT/UREA NIT SERPL: 23
D DIMER PPP IA.FEU-MCNC: 0.7 UG/ML FEU (ref 0–0.5)
EOSINOPHIL # BLD AUTO: 0.2 X10(3)/MCL (ref 0–0.9)
EOSINOPHIL NFR BLD AUTO: 2.5 %
ERYTHROCYTE [DISTWIDTH] IN BLOOD BY AUTOMATED COUNT: 16.1 % (ref 11.5–17)
FLUAV AG UPPER RESP QL IA.RAPID: NOT DETECTED
FLUBV AG UPPER RESP QL IA.RAPID: NOT DETECTED
GFR SERPLBLD CREATININE-BSD FMLA CKD-EPI: >60 ML/MIN/1.73/M2
GLOBULIN SER-MCNC: 3.8 GM/DL (ref 2.4–3.5)
GLUCOSE SERPL-MCNC: 108 MG/DL (ref 74–100)
HCT VFR BLD AUTO: 36.9 % (ref 37–47)
HGB BLD-MCNC: 11.8 G/DL (ref 12–16)
IMM GRANULOCYTES # BLD AUTO: 0.01 X10(3)/MCL (ref 0–0.04)
IMM GRANULOCYTES NFR BLD AUTO: 0.1 %
LYMPHOCYTES # BLD AUTO: 2.41 X10(3)/MCL (ref 0.6–4.6)
LYMPHOCYTES NFR BLD AUTO: 30.7 %
MAGNESIUM SERPL-MCNC: 1.8 MG/DL (ref 1.6–2.6)
MCH RBC QN AUTO: 23.8 PG (ref 27–31)
MCHC RBC AUTO-ENTMCNC: 32 G/DL (ref 33–36)
MCV RBC AUTO: 74.5 FL (ref 80–94)
MONOCYTES # BLD AUTO: 0.67 X10(3)/MCL (ref 0.1–1.3)
MONOCYTES NFR BLD AUTO: 8.5 %
NEUTROPHILS # BLD AUTO: 4.53 X10(3)/MCL (ref 2.1–9.2)
NEUTROPHILS NFR BLD AUTO: 57.8 %
NRBC BLD AUTO-RTO: 0 %
PLATELET # BLD AUTO: 250 X10(3)/MCL (ref 130–400)
PMV BLD AUTO: 11.3 FL (ref 7.4–10.4)
POTASSIUM SERPL-SCNC: 3.7 MMOL/L (ref 3.5–5.1)
PROT SERPL-MCNC: 7.2 GM/DL (ref 6.4–8.3)
RBC # BLD AUTO: 4.95 X10(6)/MCL (ref 4.2–5.4)
RSV A 5' UTR RNA NPH QL NAA+PROBE: NOT DETECTED
SARS-COV-2 RNA RESP QL NAA+PROBE: NOT DETECTED
SODIUM SERPL-SCNC: 143 MMOL/L (ref 136–145)
TROPONIN I SERPL-MCNC: 0.04 NG/ML (ref 0–0.04)
WBC # BLD AUTO: 7.85 X10(3)/MCL (ref 4.5–11.5)

## 2024-11-17 PROCEDURE — 83880 ASSAY OF NATRIURETIC PEPTIDE: CPT | Performed by: INTERNAL MEDICINE

## 2024-11-17 PROCEDURE — 99285 EMERGENCY DEPT VISIT HI MDM: CPT | Mod: 25,27

## 2024-11-17 PROCEDURE — 96374 THER/PROPH/DIAG INJ IV PUSH: CPT

## 2024-11-17 PROCEDURE — 85025 COMPLETE CBC W/AUTO DIFF WBC: CPT | Performed by: INTERNAL MEDICINE

## 2024-11-17 PROCEDURE — 85379 FIBRIN DEGRADATION QUANT: CPT | Performed by: INTERNAL MEDICINE

## 2024-11-17 PROCEDURE — 25000003 PHARM REV CODE 250: Performed by: INTERNAL MEDICINE

## 2024-11-17 PROCEDURE — 99215 OFFICE O/P EST HI 40 MIN: CPT | Mod: PBBFAC

## 2024-11-17 PROCEDURE — 25000242 PHARM REV CODE 250 ALT 637 W/ HCPCS: Performed by: INTERNAL MEDICINE

## 2024-11-17 PROCEDURE — 94761 N-INVAS EAR/PLS OXIMETRY MLT: CPT

## 2024-11-17 PROCEDURE — 99499 UNLISTED E&M SERVICE: CPT | Mod: S$PBB,,, | Performed by: FAMILY MEDICINE

## 2024-11-17 PROCEDURE — 25500020 PHARM REV CODE 255

## 2024-11-17 PROCEDURE — 83735 ASSAY OF MAGNESIUM: CPT | Performed by: INTERNAL MEDICINE

## 2024-11-17 PROCEDURE — 80053 COMPREHEN METABOLIC PANEL: CPT | Performed by: INTERNAL MEDICINE

## 2024-11-17 PROCEDURE — 84484 ASSAY OF TROPONIN QUANT: CPT | Performed by: INTERNAL MEDICINE

## 2024-11-17 PROCEDURE — 0241U COVID/RSV/FLU A&B PCR: CPT | Performed by: INTERNAL MEDICINE

## 2024-11-17 PROCEDURE — 93005 ELECTROCARDIOGRAM TRACING: CPT

## 2024-11-17 PROCEDURE — 63600175 PHARM REV CODE 636 W HCPCS: Performed by: INTERNAL MEDICINE

## 2024-11-17 RX ORDER — HYDROCODONE BITARTRATE AND ACETAMINOPHEN 7.5; 325 MG/1; MG/1
TABLET ORAL
COMMUNITY
Start: 2024-11-07

## 2024-11-17 RX ORDER — GUAIFENESIN 100 MG/5ML
400 SOLUTION ORAL ONCE
Status: COMPLETED | OUTPATIENT
Start: 2024-11-17 | End: 2024-11-17

## 2024-11-17 RX ORDER — METHOCARBAMOL 500 MG/1
1000 TABLET, FILM COATED ORAL
Status: COMPLETED | OUTPATIENT
Start: 2024-11-17 | End: 2024-11-17

## 2024-11-17 RX ORDER — MIDAZOLAM HYDROCHLORIDE 2 MG/2ML
1 INJECTION, SOLUTION INTRAMUSCULAR; INTRAVENOUS
Status: COMPLETED | OUTPATIENT
Start: 2024-11-17 | End: 2024-11-17

## 2024-11-17 RX ORDER — BENZONATATE 200 MG/1
200 CAPSULE ORAL 3 TIMES DAILY PRN
Qty: 30 CAPSULE | Refills: 0 | Status: SHIPPED | OUTPATIENT
Start: 2024-11-17 | End: 2024-11-27

## 2024-11-17 RX ORDER — PROMETHAZINE HYDROCHLORIDE AND DEXTROMETHORPHAN HYDROBROMIDE 6.25; 15 MG/5ML; MG/5ML
5 SYRUP ORAL EVERY 4 HOURS PRN
Qty: 118 ML | Refills: 0 | Status: SHIPPED | OUTPATIENT
Start: 2024-11-17 | End: 2024-11-27

## 2024-11-17 RX ORDER — ACETAMINOPHEN AND CODEINE PHOSPHATE 300; 30 MG/1; MG/1
2 TABLET ORAL
Status: DISCONTINUED | OUTPATIENT
Start: 2024-11-17 | End: 2024-11-18 | Stop reason: HOSPADM

## 2024-11-17 RX ORDER — NITROGLYCERIN 0.4 MG/1
0.4 TABLET SUBLINGUAL
Status: COMPLETED | OUTPATIENT
Start: 2024-11-17 | End: 2024-11-17

## 2024-11-17 RX ORDER — METHOCARBAMOL 500 MG/1
1000 TABLET, FILM COATED ORAL 3 TIMES DAILY PRN
Qty: 30 TABLET | Refills: 0 | Status: SHIPPED | OUTPATIENT
Start: 2024-11-17

## 2024-11-17 RX ADMIN — NITROGLYCERIN 0.4 MG: 0.4 TABLET SUBLINGUAL at 07:11

## 2024-11-17 RX ADMIN — METHOCARBAMOL 1000 MG: 500 TABLET ORAL at 08:11

## 2024-11-17 RX ADMIN — MIDAZOLAM HYDROCHLORIDE 1 MG: 1 INJECTION, SOLUTION INTRAMUSCULAR; INTRAVENOUS at 08:11

## 2024-11-17 RX ADMIN — GUAIFENESIN 400 MG: 100 SOLUTION ORAL at 08:11

## 2024-11-17 RX ADMIN — IOHEXOL 100 ML: 350 INJECTION, SOLUTION INTRAVENOUS at 09:11

## 2024-11-18 ENCOUNTER — TELEPHONE (OUTPATIENT)
Dept: GYNECOLOGY | Facility: CLINIC | Age: 54
End: 2024-11-18
Payer: COMMERCIAL

## 2024-11-18 DIAGNOSIS — Z15.09 BRCA2 GENE MUTATION POSITIVE: Primary | ICD-10-CM

## 2024-11-18 DIAGNOSIS — Z15.01 BRCA2 GENE MUTATION POSITIVE: Primary | ICD-10-CM

## 2024-11-18 LAB
OHS QRS DURATION: 166 MS
OHS QTC CALCULATION: 521 MS

## 2024-11-18 NOTE — ED PROVIDER NOTES
Encounter Date: 11/17/2024       History     Chief Complaint   Patient presents with    Chest Pain    Cough     Pleuritic L sided chest pain, coughing heavily in triage. Recently discharged from [facility] for NSTEMI. Hx Bilateral mastectomy, breast CA     Presents with left anterior/inferior chest pain with cough.  States feeling sick since Flu vaccination 3 weeks ago. Recently admitted to [facility]-LFT due to abnormal troponin and discharge, no angiogram or stress test. States was told was muscular and was prescribed Toradol. Denies fever, vomiting, hemoptysis, swelling or sick contacts    The history is provided by the patient and a relative.     Review of patient's allergies indicates:   Allergen Reactions    Codeine Shortness Of Breath     Pt denies codeine allergy    Shellfish derived Rash     States eats crawfish     Past Medical History:   Diagnosis Date    Ductal carcinoma in situ (DCIS) of left breast 10/10/2022    GERD (gastroesophageal reflux disease)     Hypertension     Migraines     Vitamin D deficiency      Past Surgical History:   Procedure Laterality Date    ANGIOGRAM, CORONARY, WITH LEFT HEART CATHETERIZATION N/A 07/29/2024    Procedure: Angiogram, Coronary, with Left Heart Cath;  Surgeon: Pankaj Singh MD;  Location: [facility] CATH LAB;  Service: Cardiology;  Laterality: N/A;    BREAST SURGERY  11/30/2022    COMPUTED TOMOGRAPHY ANGIOGRAM  09/07/2012    MASTECTOMY Bilateral 11/30/2022    PARTIAL HYSTERECTOMY  07/13/2023     Family History   Problem Relation Name Age of Onset    Hypertension Mother Carine Kerr     Heart disease Mother Carine Palmerulk     Asthma Mother Carine Velazquez     Hypertension Father Pankaj Malhotra     Heart disease Father Pankaj Malhotra     Ovarian cancer Maternal Grandmother      Breast cancer Maternal Grandmother      Breast cancer Paternal Grandmother      Cervical cancer Daughter  32     Social History     Tobacco Use    Smoking status: Never     Passive exposure: Never    Smokeless  tobacco: Never   Substance Use Topics    Alcohol use: Not Currently     Comment: Occasionally    Drug use: Never     Review of Systems   Respiratory:  Positive for cough and shortness of breath.        Physical Exam     Initial Vitals [11/17/24 1920]   BP Pulse Resp Temp SpO2   (!) 181/99 102 (!) 22 98.8 °F (37.1 °C) 98 %      MAP       --         Physical Exam    Nursing note and vitals reviewed.  Constitutional: She appears well-developed and well-nourished. No distress.   HENT:   Head: Normocephalic and atraumatic. Mouth/Throat: Oropharynx is clear and moist.   Eyes: Conjunctivae and EOM are normal. Pupils are equal, round, and reactive to light.   Neck: Neck supple.   Normal range of motion.  Cardiovascular:  Normal rate, regular rhythm, normal heart sounds and intact distal pulses.           Pulmonary/Chest: Breath sounds normal. She exhibits tenderness.   Pain reproducible by cough, lower left intercostal space, No crepitus, bruising or palpable mass   Abdominal: Abdomen is soft. Bowel sounds are normal. She exhibits no distension. There is no abdominal tenderness. There is no rebound and no guarding.   Musculoskeletal:         General: No edema. Normal range of motion.      Cervical back: Normal range of motion and neck supple.     Neurological: She is alert and oriented to person, place, and time. She has normal strength.   Skin: Skin is warm and dry. No rash noted.   Psychiatric: Her behavior is normal. Thought content normal.         ED Course   Procedures  Labs Reviewed   COMPREHENSIVE METABOLIC PANEL - Abnormal       Result Value    Sodium 143      Potassium 3.7      Chloride 109 (*)     CO2 26      Glucose 108 (*)     Blood Urea Nitrogen 19.6      Creatinine 0.84      Calcium 9.5      Protein Total 7.2      Albumin 3.4 (*)     Globulin 3.8 (*)     Albumin/Globulin Ratio 0.9 (*)     Bilirubin Total 0.2            ALT 14      AST 14      eGFR >60      Anion Gap 8.0      BUN/Creatinine Ratio 23      CBC WITH DIFFERENTIAL - Abnormal    WBC 7.85      RBC 4.95      Hgb 11.8 (*)     Hct 36.9 (*)     MCV 74.5 (*)     MCH 23.8 (*)     MCHC 32.0 (*)     RDW 16.1      Platelet 250      MPV 11.3 (*)     Neut % 57.8      Lymph % 30.7      Mono % 8.5      Eos % 2.5      Basophil % 0.4      Lymph # 2.41      Neut # 4.53      Mono # 0.67      Eos # 0.20      Baso # 0.03      IG# 0.01      IG% 0.1      NRBC% 0.0     D DIMER, QUANTITATIVE - Abnormal    D-Dimer 0.70 (*)    TROPONIN I - Normal    Troponin-I 0.044     B-TYPE NATRIURETIC PEPTIDE - Normal    Natriuretic Peptide 51.2     COVID/RSV/FLU A&B PCR - Normal    Influenza A PCR Not Detected      Influenza B PCR Not Detected      Respiratory Syncytial Virus PCR Not Detected      SARS-CoV-2 PCR Not Detected      Narrative:     The [employer] Xpress SARS-CoV-2/FLU/RSV plus is a rapid, multiplexed real-time PCR test intended for the simultaneous qualitative detection and differentiation of SARS-CoV-2, Influenza A, Influenza B, and respiratory syncytial virus (RSV) viral RNA in either nasopharyngeal swab or nasal swab specimens.         MAGNESIUM - Normal    Magnesium Level 1.80     CBC W/ AUTO DIFFERENTIAL    Narrative:     The following orders were created for panel order CBC auto differential.  Procedure                               Abnormality         Status                     ---------                               -----------         ------                     CBC with Differential[[id number]]       Abnormal            Final result                 Please view results for these tests on the individual orders.     EKG Readings: (Independently Interpreted)   Initial Reading: No STEMI. Rhythm: Normal Sinus Rhythm. Heart Rate: 86. Ectopy: No Ectopy. Conduction: Normal. ST Segments: Normal ST Segments. T Waves: Normal. Other Findings: Prolonged QT Interval. Clinical Impression: Normal Sinus Rhythm with LBBB Other Impression: QTc 521 ms       Imaging Results              CTA  Chest Non-Coronary (PE Studies) (Final result)  Result time 11/17/24 21:20:06      Final result by Kaiser Kendrick MD (11/17/24 21:20:06)                   Impression:      No pulmonary embolus is demonstrated.      Electronically signed by: Kaiser Kendrick MD  Date:    11/17/2024  Time:    21:20               Narrative:    EXAMINATION:  CTA CHEST NON CORONARY (PE STUDIES)    CLINICAL HISTORY:  Pulmonary embolism (PE) suspected, positive D-dimer;    TECHNIQUE:  Low dose axial images, sagittal and coronal reformations were obtained from the thoracic inlet to the lung bases following the IV administration of 100 mL of Omnipaque 350.  Contrast timing was optimized to evaluate the pulmonary arteries.  MIP images were performed.    Automatic exposure control (AEC) was utilized for dose reduction.    Dose: 667 mGycm    COMPARISON:  None    FINDINGS:  Mediastinum reveals no significant adenopathy.  The thoracic aorta appears intact.  The visualized portion of the upper abdomen appears normal.    No infiltrates are seen.  No peripheral nodules are noted.  There are no filling defects seen within the pulmonary arteries to indicate embolus.                                       X-Ray Chest AP Portable (Final result)  Result time 11/17/24 19:56:30      Final result by Kaiser Kendrick MD (11/17/24 19:56:30)                   Impression:      Limited study, definite acute disease is not seen      Electronically signed by: Kaiser Kendrick MD  Date:    11/17/2024  Time:    19:56               Narrative:    EXAMINATION:  XR CHEST AP PORTABLE    CLINICAL HISTORY:  Chest Pain;    TECHNIQUE:  Single frontal view of the chest was performed.    COMPARISON:  06/04/2024    FINDINGS:  Film is under penetrated and there is poor inspiratory effort.  Definite infiltrate is not seen.  Heart size is within normal limits.                                       Medications   acetaminophen-codeine 300-30mg per tablet 2 tablet (has no administration  in time range)   nitroGLYCERIN SL tablet 0.4 mg (0.4 mg Sublingual Given 11/17/24 1946)   guaiFENesin 100 mg/5 ml syrup 400 mg (400 mg Oral Given 11/17/24 2004)   methocarbamoL tablet 1,000 mg (1,000 mg Oral Given 11/17/24 2004)   iohexoL (OMNIPAQUE 350) 350 mg iodine/mL injection (100 mLs  Given 11/17/24 2105)   midazolam (PF) (VERSED) 1 mg/mL injection 1 mg (1 mg Intravenous Given 11/17/24 2058)     Medical Decision Making  Amount and/or Complexity of Data Reviewed  External Data Reviewed: radiology and notes.     Details: patient had negative cardiac angiogram in July.    FINDINGS: The heart appears enlarged. Unchanged 5 mm left lobe pulmonary nodule. 1.7 cm groundglass focus in the right lower lobe is new from prior exam.     Pulmonary angiography demonstrates no filling defects. The visualized vasculature is within normal limits. Moderate coronary artery calcifications are present.     The osseous structures are intact. Small hiatal hernia.     Labs: ordered. Decision-making details documented in ED Course.  Radiology: ordered and independent interpretation performed. Decision-making details documented in ED Course.  ECG/medicine tests: ordered and independent interpretation performed. Decision-making details documented in ED Course.    Risk  OTC drugs.  Prescription drug management.      Additional MDM:   Differential Diagnosis:   Miocardial infarction, pneumothorax, aortic dissection, pulmonary emboli, pneumonia, among others                                      Clinical Impression:  Final diagnoses:  [R07.9] Chest pain  [R07.81] Pleuritic chest pain (Primary)  [M94.0] Acute costochondritis  [I10] Uncontrolled hypertension          ED Disposition Condition    Discharge Stable          ED Prescriptions       Medication Sig Dispense Start Date End Date Auth. Provider    methocarbamoL (ROBAXIN) 500 MG Tab Take 2 tablets (1,000 mg total) by mouth 3 (three) times daily as needed (pain). 30 tablet 11/17/2024 --  Denzel Woodruff MD    benzonatate (TESSALON) 200 MG capsule Take 1 capsule (200 mg total) by mouth 3 (three) times daily as needed. 30 capsule 11/17/2024 11/27/2024 Denzel Woodruff MD    promethazine-dextromethorphan (PROMETHAZINE-DM) 6.25-15 mg/5 mL Syrp Take 5 mLs by mouth every 4 (four) hours as needed (cough). 118 mL 11/17/2024 11/27/2024 Denzel Woodruff MD          Follow-up Information       Follow up With Specialties Details Why Contact Info    Manisha Guillermo, FNP Family Medicine In 2 weeks  [address] [facility] [address]  [phone]      [facility] Emergency Medicine  If symptoms worsen [address] [facility] [address] [phone]             Denzel Woodruff MD  11/17/24 9130

## 2024-11-18 NOTE — TELEPHONE ENCOUNTER
Patient in clinic at the time will be referred to [facility] ONC for cancer reducing surgery. Gave her all info for referral with contact information and instructions on process

## 2024-11-18 NOTE — PROGRESS NOTES
Pt was recently released from the hospital.  Her family will bring her to the ED.  She has pain medication at home that she reports makes her sick.

## 2024-11-21 ENCOUNTER — TELEPHONE (OUTPATIENT)
Dept: GYNECOLOGY | Facility: CLINIC | Age: 54
End: 2024-11-21
Payer: COMMERCIAL

## 2024-11-21 NOTE — TELEPHONE ENCOUNTER
Called patient to let her know that I need the copy of her BRCA2 pathology results. She will call the lab tomorrow and get back with me.

## 2024-11-26 ENCOUNTER — OFFICE VISIT (OUTPATIENT)
Dept: INTERNAL MEDICINE | Facility: CLINIC | Age: 54
End: 2024-11-26
Payer: COMMERCIAL

## 2024-11-26 VITALS
DIASTOLIC BLOOD PRESSURE: 83 MMHG | WEIGHT: 276.81 LBS | SYSTOLIC BLOOD PRESSURE: 147 MMHG | HEART RATE: 74 BPM | HEIGHT: 62 IN | BODY MASS INDEX: 50.94 KG/M2 | TEMPERATURE: 98 F | OXYGEN SATURATION: 99 %

## 2024-11-26 DIAGNOSIS — I10 PRIMARY HYPERTENSION: ICD-10-CM

## 2024-11-26 DIAGNOSIS — I50.20 ACC/AHA STAGE C HEART FAILURE WITH REDUCED EJECTION FRACTION: ICD-10-CM

## 2024-11-26 DIAGNOSIS — R07.81 PLEURITIC CHEST PAIN: ICD-10-CM

## 2024-11-26 DIAGNOSIS — Z02.89 ENCOUNTER FOR COMPLETION OF FORM WITH PATIENT: ICD-10-CM

## 2024-11-26 DIAGNOSIS — R91.1 SOLITARY PULMONARY NODULE: ICD-10-CM

## 2024-11-26 PROCEDURE — 99214 OFFICE O/P EST MOD 30 MIN: CPT | Mod: PBBFAC | Performed by: NURSE PRACTITIONER

## 2024-11-26 PROCEDURE — 99214 OFFICE O/P EST MOD 30 MIN: CPT | Mod: S$PBB,,, | Performed by: NURSE PRACTITIONER

## 2024-11-26 RX ORDER — METOPROLOL SUCCINATE 50 MG/1
50 TABLET, EXTENDED RELEASE ORAL DAILY
Qty: 90 TABLET | Refills: 1 | Status: SHIPPED | OUTPATIENT
Start: 2024-11-26 | End: 2025-11-26

## 2024-11-26 NOTE — PROGRESS NOTES
Internal Medicine Clinic  MARY Cunha     Patient Name: Saeid Cheema   : 1970  MRN:[id number]     Chief Complaint     Chief Complaint   Patient presents with    Follow-up    [place]  paperwork         History of Present Illness     54 year old AAF, presents in clinic for completion of continuous leave, [facility] paperwork related to pleuritic chest pain finding.  Also, pt has persistently elevated BP on current regimen.  Patient admitted [facility] 2024 (discharged 2024) for chest pain and noted to have new pulmonary lesion that has not been addressed yet. Repeat TTE was done with some abnormal findings but patient ultimately discharged in stable condition with plan for close follow up with her cardiologist at [facility] which she will see in 2 days. She had a mildly elevated troponin at 0.04 which was trended x 2 and did not change.  Her CTA was negative for PE; displayed a 1.7 cm groundglass focus in the right lower lobe, new from prior exam. Consider 6-12 month follow-up per Fleischner criteria.   Repeat echocardiogram was performed.  Cardiology was consulted.  Recommendations to continue with current medical management.  She will need to follow-up with [facility] cardiology clinic outpatient.  Her pain seems to have improved quite substantially after treatment with hydrocodone and Toradol.   c/o nonproductive cough for >1 month; Afebrile.     PMH HTN, HLD, prediabetes, CAD, HFrEF, left breast cancer 10/7/2022, left lower lobe lung nodule, GERD, migraines, genital herpes, IC, hematuria, chlamydia pneumonia 3/2022, BV. Nonsmoker. PMH TIA, and small cerebral aneurysm in . 2021; MR angio head and MRI brain without acute findings. EEG 2021; normal.   Following Dr. Shankar [facility]UR[facility] for migraines. He is trying to get her approved for BOTOX injections for migraine prevention. Followed by [facility] GYN and [facility] Urology clinic for further workup of hematuria; cystoscopy 2020 displayed Microscopic  hematuria, Squamous metaplasia. Status post bilateral mastectomy 11/30/2022 [facility] with the large open wound in the right chest. Seroma in the region of the left lateral chest wall. Wound are at OL per Dr. Rowley. [facility] [address], Dr. Herron and [facility].   She is on valacyclovir 500mg daily for genital herpes-suppression; voices 3 outbreaks per yr, in which she doubles her med for 5 days during an outbreak.   PFT 8/10/2022 moderate restriction, no bronchodilator response. Normal lung volumes. Although she is requesting refill on albuterol as she states she feels it helps her breathing.  PFT 10/26/23 WNL  States her  passed away of cardiac complications 4/1/2022. Lives with 15 year old grandson.   Denies CP, SOB, cough, fever, dizziness, weakness, abdominal pain, nausea, vomiting, diarrhea, constipation, depression, SI, HI.  Works at [employer]  Colonoscopy with polyps, Dr. Richardson 2021; told she needed to repeat scope but states she could not afford the repeat scope.     She has [facility] intermittent leave paperwork for h/o migraines.         PFT 11-4-2024  Patient struggled with coughing throughout testing.  Albuterol 2.5 mg given HR 77/74, SAT 97%, BBS diminshed  Normal spirometry. No bronchodilator response. Lung volumes indicate mild restriction. Diffusion capacity is mildly  impaired.                   Review of Systems     Review of Systems   Constitutional: Negative.    HENT: Negative.     Eyes: Negative.    Respiratory:          Chest wall pain   Cardiovascular: Negative.    Gastrointestinal: Negative.    Endocrine: Negative.    Genitourinary: Negative.    Musculoskeletal: Negative.    Integumentary:  Negative.   Allergic/Immunologic: Negative.    Neurological: Negative.    Hematological: Negative.    Psychiatric/Behavioral: Negative.     All other systems reviewed and are negative.       Physical Examination     Visit Vitals  BP (!) 147/83   Pulse 74  "  Temp 97.9 °F (36.6 °C) (Oral)   Ht 5' 2" (1.575 m)   Wt 125.6 kg (276 lb 12.8 oz)   SpO2 99%   BMI 50.63 kg/m²        BP Readings from Last 6 Encounters:   11/26/24 (!) 147/83   11/17/24 (!) 166/84   11/17/24 (!) 181/116   11/15/24 (!) 150/80   11/13/24 135/83   11/13/24 128/76   ]    Wt Readings from Last 6 Encounters:   11/26/24 125.6 kg (276 lb 12.8 oz)   11/17/24 129.3 kg (285 lb)   11/17/24 126.9 kg (279 lb 12.2 oz)   11/15/24 126.6 kg (279 lb 3.2 oz)   11/13/24 126 kg (277 lb 11.2 oz)   11/13/24 125.6 kg (277 lb)   ]    BMI Readings from Last 3 Encounters:   11/26/24 50.63 kg/m²   11/17/24 52.13 kg/m²   11/17/24 51.17 kg/m²         Physical Exam  Vitals and nursing note reviewed.   Constitutional:       Appearance: Normal appearance.   HENT:      Head: Normocephalic and atraumatic.      Right Ear: Tympanic membrane, ear canal and external ear normal.      Left Ear: Tympanic membrane, ear canal and external ear normal.      Nose: Nose normal.      Mouth/Throat:      Mouth: Mucous membranes are moist.      Pharynx: Oropharynx is clear.   Eyes:      Extraocular Movements: Extraocular movements intact.      Conjunctiva/sclera: Conjunctivae normal.      Pupils: Pupils are equal, round, and reactive to light.   Cardiovascular:      Rate and Rhythm: Normal rate and regular rhythm.      Pulses: Normal pulses.      Heart sounds: Normal heart sounds.   Pulmonary:      Effort: Pulmonary effort is normal.      Breath sounds: Normal breath sounds.   Abdominal:      General: Abdomen is flat. Bowel sounds are normal.      Palpations: Abdomen is soft.   Musculoskeletal:         General: Normal range of motion.      Cervical back: Normal range of motion and neck supple.   Skin:     General: Skin is warm and dry.      Capillary Refill: Capillary refill takes less than 2 seconds.   Neurological:      General: No focal deficit present.      Mental Status: She is alert and oriented to person, place, and time. Mental status is at " baseline.   Psychiatric:         Mood and Affect: Mood normal.         Behavior: Behavior normal.         Thought Content: Thought content normal.         Judgment: Judgment normal.          Labs / Imaging     Chemistry:  Lab Results   Component Value Date     11/17/2024     11/06/2024    K 3.7 11/17/2024    K 3.9 11/06/2024    BUN 19.6 11/17/2024    BUN 15 11/06/2024    CREATININE 0.84 11/17/2024    CREATININE 0.59 11/06/2024    EGFRNORACEVR >60 11/17/2024    GLUCOSE 108 (H) 11/17/2024    CALCIUM 9.5 11/17/2024    CALCIUM 9.2 11/06/2024    ALKPHOS 102 11/17/2024    LABPROT 7.2 11/17/2024    ALBUMIN 3.4 (L) 11/17/2024    BILIDIR 0.1 07/13/2022    IBILI 0.10 01/25/2022    AST 14 11/17/2024    ALT 14 11/17/2024    MG 1.80 11/17/2024    [id number] 37 11/15/2024        Lab Results   Component Value Date    HGBA1C 5.9 11/15/2024        Hematology:  Lab Results   Component Value Date    WBC 7.85 11/17/2024    RBC 4.95 11/17/2024    HGB 11.8 (L) 11/17/2024    HCT 36.9 (L) 11/17/2024    MCV 74.5 (L) 11/17/2024    MCH 23.8 (L) 11/17/2024    MCHC 32.0 (L) 11/17/2024    RDW 16.1 11/17/2024     11/17/2024    MPV 11.3 (H) 11/17/2024        Lipid Panel:  Lab Results   Component Value Date    CHOL 169 11/15/2024    HDL 47 11/15/2024    .00 11/15/2024    TRIG 90 11/15/2024    TOTALCHOLEST 4 11/15/2024        Urine:  Lab Results   Component Value Date    APPEARANCEUA Clear 08/20/2024    SGUA 1.029 08/20/2024    PROTEINUA Trace (A) 08/20/2024    KETONESUA Negative 08/20/2024    LEUKOCYTESUR Negative 08/20/2024    RBCUA 0-5 08/20/2024    WBCUA 0-5 08/20/2024    BACTERIA Trace (A) 08/20/2024    SQEPUA Moderate (A) 08/20/2024    HYALINECASTS 0-2 (A) 08/20/2024    CREATRANDUR 222.0 02/19/2018          Assessment       ICD-10-CM ICD-9-CM   1. Encounter for completion of form with patient  Z02.89 V68.89   2. Primary hypertension  I10 401.9   3. Pleuritic chest pain  R07.81 786.52   4. Solitary pulmonary nodule   R91.1 793.11   5. ACC/AHA stage C heart failure with reduced ejection fraction  I50.20 428.9        Plan     1. Encounter for completion of form with patient  [facility] paperwork completed, scanned to chart    2. Primary hypertension  Increase metoprolol to 50 daily  Continue aldactone and entresto  HR stable. BP elevated.  DASH diet: Eat more fruits, vegetables, and low fat dairy foods.  (Less than 2 grams of sodium per day).  Maintain healthy weight with goal BMI <30.   Exercise 30 minutes per day 5 days per week.  Home medications refilled and continued.   Home BP monitoring encouraged with BP parameters given.      3. Pleuritic chest pain  Referral to PULM, apt is 8/2025    4. Solitary pulmonary nodule  Referral to PULM, apt is 8/2025  CTA chest 11/5/2024 -1.7 cm groundglass focus in the right lower lobe, new from prior exam. Consider 6-12 month follow-up per Fleischner criteria.     5. ACC/AHA stage C heart failure with reduced ejection fraction  Following Cardio  - metoprolol succinate (TOPROL-XL) 50 MG 24 hr tablet; Take 1 tablet (50 mg total) by mouth once daily.  Dispense: 90 tablet; Refill: 1      Current Outpatient Medications   Medication Instructions    albuterol (PROVENTIL/VENTOLIN HFA) 90 mcg/actuation inhaler 2 puffs, Inhalation, Every 6 hours PRN, Rescue    albuterol-ipratropium (DUO-NEB) 2.5 mg-0.5 mg/3 mL nebulizer solution 3 mLs, Nebulization, Every 6 hours PRN, Rescue    ascorbic acid (vitamin C) (VITAMIN C) 1,000 mg, Daily    benzonatate (TESSALON) 200 mg, Oral, 3 times daily PRN    cloNIDine (CATAPRES) 0.2 mg, Oral, Nightly    empagliflozin (JARDIANCE) 10 mg, Oral, Daily    furosemide (LASIX) 40 mg, Oral, Daily    HYDROcodone-acetaminophen (NORCO) 7.5-325 mg per tablet TAKE 1 TABLET BY MOUTH EVERY 4 HOURS AS NEEDED FOR UP TO 7 DAYS.    ketorolac (TORADOL) 10 mg, Every 12 hours PRN    meclizine (ANTIVERT) 25 mg, Oral, 3 times daily PRN    methocarbamoL (ROBAXIN) 1,000 mg, Oral, 3 times daily PRN     metoprolol succinate (TOPROL-XL) 50 mg, Oral, Daily    nitroGLYCERIN (NITROSTAT) 0.4 mg, Sublingual, Every 5 min PRN    omeprazole (PRILOSEC) 40 mg, Oral, Daily    ondansetron (ZOFRAN) 4 mg, Oral, Every 12 hours PRN    polyethylene glycol (MOVIPREP) 100-7.5-2.691 gram solution Take as directed prior to colonoscopy    promethazine-dextromethorphan (PROMETHAZINE-DM) 6.25-15 mg/5 mL Syrp 5 mLs, Oral, Every 4 hours PRN    sacubitriL-valsartan (ENTRESTO) 49-51 mg per tablet 1 tablet, Oral, 2 times daily    spironolactone (ALDACTONE) 25 mg, Oral, Daily    valACYclovir (VALTREX) 500 mg, Oral, Daily           Future Appointments   Date Time Provider Department Center   12/3/2024 10:20 AM Manisha Guillermo FNP [facility] INTMED [place]    12/11/2024  7:00 AM [facility] ECHO 01 [facility] ECHO [place]    12/17/2024  8:00 AM NURSE, [facility] CARDIOLOGY [facility] CARD [place]   1/9/2025 11:40 AM [facility]    1/16/2025  2:00 PM Pankaj Singh MD [facility] CARD [place]   1/21/2025  7:00 AM Manisha Guillermo FNP [facility] INTMED [place]   8/4/2025  9:30 AM PROVIDER, [facility]MONOLOGY [facility]         Medication refills addressed today.  RTC prn and as scheduled 12/3  COVID 19 precautions given to patient.  Patient voices understanding of all discharge instructions.      MARY Cunha

## 2024-12-03 ENCOUNTER — OFFICE VISIT (OUTPATIENT)
Dept: INTERNAL MEDICINE | Facility: CLINIC | Age: 54
End: 2024-12-03
Payer: COMMERCIAL

## 2024-12-03 VITALS
RESPIRATION RATE: 16 BRPM | HEART RATE: 72 BPM | HEIGHT: 62 IN | TEMPERATURE: 98 F | BODY MASS INDEX: 51.34 KG/M2 | WEIGHT: 279 LBS | SYSTOLIC BLOOD PRESSURE: 138 MMHG | DIASTOLIC BLOOD PRESSURE: 80 MMHG

## 2024-12-03 DIAGNOSIS — I10 PRIMARY HYPERTENSION: ICD-10-CM

## 2024-12-03 DIAGNOSIS — R07.81 PLEURITIC CHEST PAIN: ICD-10-CM

## 2024-12-03 DIAGNOSIS — R06.09 DOE (DYSPNEA ON EXERTION): ICD-10-CM

## 2024-12-03 PROCEDURE — 99214 OFFICE O/P EST MOD 30 MIN: CPT | Mod: S$PBB,,, | Performed by: NURSE PRACTITIONER

## 2024-12-03 PROCEDURE — 99215 OFFICE O/P EST HI 40 MIN: CPT | Mod: PBBFAC | Performed by: NURSE PRACTITIONER

## 2024-12-03 NOTE — PROGRESS NOTES
Internal Medicine Clinic  MARY Cunha     Patient Name: Saeid Cheema   : 1970  MRN:[id number]     Chief Complaint     No chief complaint on file.       History of Present Illness     54 year old AAF, presents in clinic for BP check since increasing metoprolol and return to work paperwork; Return date 2024. Pleuritic Chest wall pain improved.    Previously completed continuous leave, FMLA paperwork related to pleuritic chest pain finding.   Patient admitted [facility] 2024 (discharged 2024) for chest pain and noted to have new pulmonary lesion that has not been addressed yet. Repeat TTE was done with some abnormal findings but patient ultimately discharged in stable condition with plan for close follow up with her cardiologist at [facility] which she will see in 2 days. She had a mildly elevated troponin at 0.04 which was trended x 2 and did not change.  Her CTA was negative for PE; displayed a 1.7 cm groundglass focus in the right lower lobe, new from prior exam. Consider 6-12 month follow-up per Fleischner criteria.   Repeat echocardiogram was performed.  Cardiology was consulted.  Recommendations to continue with current medical management.  She will need to follow-up with [facility] cardiology clinic outpatient.  Her pain seems to have improved quite substantially after treatment with hydrocodone and Toradol.     Of note, she is scheduled at [facility] cancer center tomorrow for eval risk reducing bilateral salpingo-oophorectomy (rrBSO).    PMH HTN, HLD, prediabetes, CAD, HFrEF, left breast cancer 10/7/2022, left lower lobe lung nodule, GERD, migraines, genital herpes, IC, hematuria, chlamydia pneumonia 3/2022, BV. Nonsmoker. PMH TIA, and small cerebral aneurysm in . 2021; MR angio head and MRI brain without acute findings. EEG 2021; normal.   Following Dr. Shankar [facility] NEURO for migraines. He is trying to get her approved for BOTOX injections for migraine prevention. Followed by [facility] GYN  and [facility] Urology clinic for further workup of hematuria; cystoscopy 1/2020 displayed Microscopic hematuria, Squamous metaplasia. Status post bilateral mastectomy 11/30/2022 [facility] with the large open wound in the right chest. Seroma in the region of the left lateral chest wall. Wound are at OLOL per Dr. Rowley. [facility] [address], Dr. Herron and [facility].   She is on valacyclovir 500mg daily for genital herpes-suppression; voices 3 outbreaks per yr, in which she doubles her med for 5 days during an outbreak.   PFT 8/10/2022 moderate restriction, no bronchodilator response. Normal lung volumes. Although she is requesting refill on albuterol as she states she feels it helps her breathing.  PFT 10/26/23 WNL  States her  passed away of cardiac complications 4/1/2022. Lives with 15 year old grandson.   Denies CP, SOB, cough, fever, dizziness, weakness, abdominal pain, nausea, vomiting, diarrhea, constipation, depression, SI, HI.  Works at [employer]  Colonoscopy with polyps, Dr. Richardson 2021; told she needed to repeat scope but states she could not afford the repeat scope.     She has [facility] intermittent leave paperwork for h/o migraines.         PFT 11-4-2024  Patient struggled with coughing throughout testing.  Albuterol 2.5 mg given HR 77/74, SAT 97%, BBS diminshed  Normal spirometry. No bronchodilator response. Lung volumes indicate mild restriction. Diffusion capacity is mildly  impaired.                             Review of Systems     Review of Systems   Constitutional: Negative.    HENT: Negative.     Eyes: Negative.    Respiratory: Negative.     Cardiovascular: Negative.    Gastrointestinal: Negative.    Endocrine: Negative.    Genitourinary: Negative.    Musculoskeletal: Negative.    Integumentary:  Negative.   Allergic/Immunologic: Negative.    Neurological: Negative.    Hematological: Negative.    Psychiatric/Behavioral: Negative.     All other systems  "reviewed and are negative.       Physical Examination     Visit Vitals  /80 (BP Location: Left arm, Patient Position: Sitting)   Pulse 72   Temp 98.2 °F (36.8 °C) (Oral)   Resp 16   Ht 5' 2" (1.575 m)   Wt 126.6 kg (279 lb)   BMI 51.03 kg/m²        BP Readings from Last 6 Encounters:   12/03/24 138/80   11/26/24 (!) 147/83   11/17/24 (!) 166/84   11/17/24 (!) 181/116   11/15/24 (!) 150/80   11/13/24 135/83   ]    Wt Readings from Last 6 Encounters:   12/03/24 126.6 kg (279 lb)   11/26/24 125.6 kg (276 lb 12.8 oz)   11/17/24 129.3 kg (285 lb)   11/17/24 126.9 kg (279 lb 12.2 oz)   11/15/24 126.6 kg (279 lb 3.2 oz)   11/13/24 126 kg (277 lb 11.2 oz)   ]    BMI Readings from Last 3 Encounters:   12/03/24 51.03 kg/m²   11/26/24 50.63 kg/m²   11/17/24 52.13 kg/m²         Physical Exam  Vitals and nursing note reviewed.   Constitutional:       Appearance: Normal appearance.   HENT:      Head: Normocephalic and atraumatic.      Right Ear: Tympanic membrane, ear canal and external ear normal.      Left Ear: Tympanic membrane, ear canal and external ear normal.      Nose: Nose normal.      Mouth/Throat:      Mouth: Mucous membranes are moist.      Pharynx: Oropharynx is clear.   Eyes:      Extraocular Movements: Extraocular movements intact.      Conjunctiva/sclera: Conjunctivae normal.      Pupils: Pupils are equal, round, and reactive to light.   Cardiovascular:      Rate and Rhythm: Normal rate and regular rhythm.      Pulses: Normal pulses.      Heart sounds: Normal heart sounds.   Pulmonary:      Effort: Pulmonary effort is normal.      Breath sounds: Normal breath sounds.   Abdominal:      General: Abdomen is flat. Bowel sounds are normal.      Palpations: Abdomen is soft.   Musculoskeletal:         General: Normal range of motion.      Cervical back: Normal range of motion and neck supple.   Skin:     General: Skin is warm and dry.      Capillary Refill: Capillary refill takes less than 2 seconds. "   Neurological:      General: No focal deficit present.      Mental Status: She is alert and oriented to person, place, and time. Mental status is at baseline.   Psychiatric:         Mood and Affect: Mood normal.         Behavior: Behavior normal.         Thought Content: Thought content normal.         Judgment: Judgment normal.          Labs / Imaging     Chemistry:  Lab Results   Component Value Date     11/17/2024     11/06/2024    K 3.7 11/17/2024    K 3.9 11/06/2024    BUN 19.6 11/17/2024    BUN 15 11/06/2024    CREATININE 0.84 11/17/2024    CREATININE 0.59 11/06/2024    EGFRNORACEVR >60 11/17/2024    GLUCOSE 108 (H) 11/17/2024    CALCIUM 9.5 11/17/2024    CALCIUM 9.2 11/06/2024    ALKPHOS 102 11/17/2024    LABPROT 7.2 11/17/2024    ALBUMIN 3.4 (L) 11/17/2024    BILIDIR 0.1 07/13/2022    IBILI 0.10 01/25/2022    AST 14 11/17/2024    ALT 14 11/17/2024    MG 1.80 11/17/2024    [id number] 37 11/15/2024        Lab Results   Component Value Date    HGBA1C 5.9 11/15/2024        Hematology:  Lab Results   Component Value Date    WBC 7.85 11/17/2024    RBC 4.95 11/17/2024    HGB 11.8 (L) 11/17/2024    HCT 36.9 (L) 11/17/2024    MCV 74.5 (L) 11/17/2024    MCH 23.8 (L) 11/17/2024    MCHC 32.0 (L) 11/17/2024    RDW 16.1 11/17/2024     11/17/2024    MPV 11.3 (H) 11/17/2024        Lipid Panel:  Lab Results   Component Value Date    CHOL 169 11/15/2024    HDL 47 11/15/2024    .00 11/15/2024    TRIG 90 11/15/2024    TOTALCHOLEST 4 11/15/2024        Urine:  Lab Results   Component Value Date    APPEARANCEUA Clear 08/20/2024    SGUA 1.029 08/20/2024    PROTEINUA Trace (A) 08/20/2024    KETONESUA Negative 08/20/2024    LEUKOCYTESUR Negative 08/20/2024    RBCUA 0-5 08/20/2024    WBCUA 0-5 08/20/2024    BACTERIA Trace (A) 08/20/2024    SQEPUA Moderate (A) 08/20/2024    HYALINECASTS 0-2 (A) 08/20/2024    CREATRANDUR 222.0 02/19/2018          Assessment       ICD-10-CM ICD-9-CM   1. Primary hypertension   I10 401.9   2. Pleuritic chest pain  R07.81 786.52   3. DUARTE (dyspnea on exertion)  R06.09 786.09   4. BMI 50.0-59.9, adult  Z68.43 V85.43        Plan   1. Primary hypertension  Improved on metoprolol 50 daily. BP and HR stable. Med refills. DASH diet: Eat more fruits, vegetables, and low fat dairy foods.  (Less than 2 grams of sodium per day).  Maintain healthy weight with goal BMI <30.   Exercise 30 minutes per day 5 days per week.  Home medications refilled and continued.   Home BP monitoring encouraged with BP parameters given.      2. Pleuritic chest pain  Improved  Return to work date 12/6/2024, no restrictions  Keep apt for ECHO, and f/u with Cardio and pulmonary clinics    3. DUARTE (dyspnea on exertion)  Improved  Keep apt for ECHO, and f/u with Cardio and pulmonary clinics    4. BMI 50.0-59.9, adult  Goal BMI <30.  Exercise 5 times a week for 30 minutes per day.  Avoid soda, simple sugars, excessive rice, potatoes or bread. Limit fast foods and fried foods.  Choose complex carbs in moderation (example: green vegetables, beans, oatmeal). Eat plenty of fresh fruits and vegetables with lean meats daily.  Do not skip meals. Eat a balanced portion size.  Avoid fad diets. Consider permanent healthy life style changes.           Current Outpatient Medications   Medication Instructions    albuterol (PROVENTIL/VENTOLIN HFA) 90 mcg/actuation inhaler 2 puffs, Inhalation, Every 6 hours PRN, Rescue    albuterol-ipratropium (DUO-NEB) 2.5 mg-0.5 mg/3 mL nebulizer solution 3 mLs, Nebulization, Every 6 hours PRN, Rescue    ascorbic acid (vitamin C) (VITAMIN C) 1,000 mg, Daily    cloNIDine (CATAPRES) 0.2 mg, Oral, Nightly    empagliflozin (JARDIANCE) 10 mg, Oral, Daily    furosemide (LASIX) 40 mg, Oral, Daily    HYDROcodone-acetaminophen (NORCO) 7.5-325 mg per tablet TAKE 1 TABLET BY MOUTH EVERY 4 HOURS AS NEEDED FOR UP TO 7 DAYS.    ketorolac (TORADOL) 10 mg, Every 12 hours PRN    meclizine (ANTIVERT) 25 mg, Oral, 3 times  daily PRN    methocarbamoL (ROBAXIN) 1,000 mg, Oral, 3 times daily PRN    metoprolol succinate (TOPROL-XL) 50 mg, Oral, Daily    nitroGLYCERIN (NITROSTAT) 0.4 mg, Sublingual, Every 5 min PRN    omeprazole (PRILOSEC) 40 mg, Oral, Daily    ondansetron (ZOFRAN) 4 mg, Oral, Every 12 hours PRN    polyethylene glycol (MOVIPREP) 100-7.5-2.691 gram solution Take as directed prior to colonoscopy    sacubitriL-valsartan (ENTRESTO) 49-51 mg per tablet 1 tablet, Oral, 2 times daily    spironolactone (ALDACTONE) 25 mg, Oral, Daily    valACYclovir (VALTREX) 500 mg, Oral, Daily         Future Appointments   Date Time Provider Department Center   12/11/2024  7:00 AM [facility] ECHO 01 [facility] ECHO [place]    12/17/2024  8:00 AM NURSE, [facility] CARDIOLOGY [facility] CARD [place]   1/9/2025 11:40 AM Comfort Naranjo MD [facility] BSRG [place]    1/16/2025  2:00 PM Pankaj Singh MD [facility] CARD [place]   1/21/2025  7:00 AM Manisha Guillermo FNP [facility] INTMED [place]   8/4/2025  9:30 AM PROVIDER, [facility] PULMONOLOGY [facility] PULM [place]        Follow up if symptoms worsen or fail to improve.    Labs thoroughly reviewed with patient. Medication refills addressed today.  RTC prn and as scheduled in Jan 2025  COVID 19 precautions given to patient.  Patient voices understanding of all discharge instructions.      MARY Cunha

## 2024-12-16 DIAGNOSIS — I50.9 CONGESTIVE HEART FAILURE, UNSPECIFIED HF CHRONICITY, UNSPECIFIED HEART FAILURE TYPE: Primary | ICD-10-CM

## 2025-01-16 ENCOUNTER — OFFICE VISIT (OUTPATIENT)
Dept: CARDIOLOGY | Facility: CLINIC | Age: 55
End: 2025-01-16
Payer: COMMERCIAL

## 2025-01-16 ENCOUNTER — LAB VISIT (OUTPATIENT)
Dept: LAB | Facility: HOSPITAL | Age: 55
End: 2025-01-16
Attending: INTERNAL MEDICINE
Payer: COMMERCIAL

## 2025-01-16 VITALS
DIASTOLIC BLOOD PRESSURE: 80 MMHG | HEIGHT: 62 IN | SYSTOLIC BLOOD PRESSURE: 160 MMHG | TEMPERATURE: 98 F | HEART RATE: 85 BPM | BODY MASS INDEX: 51.71 KG/M2 | WEIGHT: 281 LBS | RESPIRATION RATE: 20 BRPM | OXYGEN SATURATION: 100 %

## 2025-01-16 DIAGNOSIS — I50.9 CONGESTIVE HEART FAILURE, UNSPECIFIED HF CHRONICITY, UNSPECIFIED HEART FAILURE TYPE: ICD-10-CM

## 2025-01-16 DIAGNOSIS — I50.9 CONGESTIVE HEART FAILURE, UNSPECIFIED HF CHRONICITY, UNSPECIFIED HEART FAILURE TYPE: Primary | ICD-10-CM

## 2025-01-16 LAB
ANION GAP SERPL CALC-SCNC: 6 MEQ/L
BUN SERPL-MCNC: 15.9 MG/DL (ref 9.8–20.1)
CALCIUM SERPL-MCNC: 9.3 MG/DL (ref 8.4–10.2)
CHLORIDE SERPL-SCNC: 108 MMOL/L (ref 98–107)
CO2 SERPL-SCNC: 26 MMOL/L (ref 22–29)
CREAT SERPL-MCNC: 0.71 MG/DL (ref 0.55–1.02)
CREAT/UREA NIT SERPL: 22
GFR SERPLBLD CREATININE-BSD FMLA CKD-EPI: >60 ML/MIN/1.73/M2
GLUCOSE SERPL-MCNC: 100 MG/DL (ref 74–100)
POTASSIUM SERPL-SCNC: 3.7 MMOL/L (ref 3.5–5.1)
SODIUM SERPL-SCNC: 140 MMOL/L (ref 136–145)

## 2025-01-16 PROCEDURE — 80048 BASIC METABOLIC PNL TOTAL CA: CPT

## 2025-01-16 PROCEDURE — 99215 OFFICE O/P EST HI 40 MIN: CPT | Mod: PBBFAC | Performed by: INTERNAL MEDICINE

## 2025-01-16 PROCEDURE — 36415 COLL VENOUS BLD VENIPUNCTURE: CPT

## 2025-01-16 RX ORDER — SACUBITRIL AND VALSARTAN 49; 51 MG/1; MG/1
1 TABLET, FILM COATED ORAL 2 TIMES DAILY
Qty: 180 TABLET | Refills: 1 | Status: SHIPPED | OUTPATIENT
Start: 2025-01-16 | End: 2025-01-16

## 2025-01-16 RX ORDER — SACUBITRIL AND VALSARTAN 97; 103 MG/1; MG/1
1 TABLET, FILM COATED ORAL 2 TIMES DAILY
Qty: 180 TABLET | Refills: 1 | Status: SHIPPED | OUTPATIENT
Start: 2025-01-16 | End: 2025-07-15

## 2025-01-16 NOTE — PATIENT INSTRUCTIONS
REMINDERS:    Go to 1st floor lab for NON FASTING BMP today to establish baseline.    Go to lab 1st floor lab in 2 weeks for NON FASTING BMP; appointments not required for blood work    Keep BP log at home.  Bring BP log / all medications / home BP monitor to your nurse visit. Take your medications at least 1 hour before your nurse visit.

## 2025-01-16 NOTE — PROGRESS NOTES
01/16/2025 2:07 PM    Subjective:     CHIEF COMPLAINT:  Dyspnea on exertion                        HPI:    Ms. Saeid Cheema is a 54 y.o. female with past medical history of HTN, HLD, prediabetes, LBBB, NICM ([facility] 7/29/24)/HFrEF 25-30%, left breast cancer 10/7/2022s/p double mastectomy and chemo, left lower lobe lung nodule, GERD, and migraines who presents to Cardiology Clinic for cardiac risk stratification prior to hysterectomy d/t metastatic  dease to ovaries.     She visited the cardiology clinic last time for pre op assessment for elective hysterectomy and she is at moderate to high risk for major surgery.  Today /80. She complained of SOB on exertion mostly so since last few weeks, orthopnea, leg swelling and she is compliant with all her medications and takes lasix daily. She is never a smoker and she do haven family history of heart attacks in her immediate family members. She stated that she takes clonidine as needed, don't really check her BP she stated that she can feel it when her BP high she gets a bad headache. She mentioned that she tried to follow a good diet and despite that she is still gaining weight. No history of asthma but she developed wheezing since she had covid in the past and she use inhalers as instructed.    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    Pelvic pain    ACC/AHA stage C heart failure with reduced ejection fraction    CHF (congestive heart failure)   Surgical History    Past Surgical History:   Procedure Laterality Date    ANGIOGRAM, CORONARY, WITH LEFT HEART CATHETERIZATION N/A 07/29/2024    Procedure: Angiogram, Coronary, with Left Heart Cath;  Surgeon: Pankaj Singh MD;  Location: [facility] CATH LAB;   Service: Cardiology;  Laterality: N/A;    BREAST SURGERY  11/30/2022    COMPUTED TOMOGRAPHY ANGIOGRAM  09/07/2012    MASTECTOMY Bilateral 11/30/2022    PARTIAL HYSTERECTOMY  07/13/2023   Social History    Social History     Socioeconomic History    Marital status:     Number of children: 5   Occupational History    Occupation: Employed   Tobacco Use    Smoking status: Never     Passive exposure: Never    Smokeless tobacco: Never   Substance and Sexual Activity    Alcohol use: Not Currently     Comment: Occasionally    Drug use: Never    Sexual activity: Not Currently     Partners: Male     Birth control/protection: Post-menopausal     Social Drivers of Health     Financial Resource Strain: Low Risk  (11/5/2024)    [facility]    Overall Financial Resource Strain (CARDIA)     Difficulty of Paying Living Expenses: Not hard at all   Recent Concern: Financial Resource Strain - Medium Risk (8/20/2024)    Overall Financial Resource Strain (CARDIA)     Difficulty of Paying Living Expenses: Somewhat hard   Food Insecurity: No Food Insecurity (11/5/2024)    [facility]    Hunger Vital Sign     Worried About Running Out of Food in the Last Year: Never true     Ran Out of Food in the Last Year: Never true   Recent Concern: Food Insecurity - Food Insecurity Present (8/20/2024)    Hunger Vital Sign     Worried About Running Out of Food in the Last Year: Sometimes true     Ran Out of Food in the Last Year: Sometimes true   Transportation Needs: No Transportation Needs (11/5/2024)    Received from [facility]    PRAPARE - Transportation     Lack of Transportation (Medical): No     Lack of Transportation (Non-Medical): No   Physical Activity: Insufficiently Active (8/20/2024)     Exercise Vital Sign     Days of Exercise per Week: 1 day     Minutes of Exercise per Session: 20 min   Stress: Stress Concern Present (8/20/2024)    Citizen of [place] [employer] - Occupational Stress Questionnaire     Feeling of Stress : Very much   Housing Stability: Low Risk  (11/5/2024)    Received from [facility]    Housing Stability Vital Sign     Unable to Pay for Housing in the Last Year: No     Number of Times Moved in the Last Year: 0     Homeless in the Last Year: No   Recent Concern: Housing Stability - High Risk (8/20/2024)    Housing Stability Vital Sign     Unable to Pay for Housing in the Last Year: Yes     Homeless in the Last Year: No   Family History    Family History   Problem Relation Name Age of Onset    Hypertension Mother Carine Velazquez     Heart disease Mother Carine Velazquez     Asthma Mother Carine Velazquez     Hypertension Father Pankaj Malhotra     Heart disease Father Pankaj Malhotra     Ovarian cancer Maternal Grandmother      Breast cancer Maternal Grandmother      Breast cancer Paternal Grandmother      Cervical cancer Daughter  32   Allergy    Review of patient's allergies indicates:   Allergen Reactions    Codeine Shortness Of Breath     Pt denies codeine allergy    Shellfish derived Rash     States eats crawfish   Current Medications    Current Outpatient Medications:     albuterol (PROVENTIL/VENTOLIN HFA) 90 mcg/actuation inhaler, Inhale 2 puffs into the lungs every 6 (six) hours as needed for Wheezing. Rescue, Disp: 8 g, Rfl: 1    albuterol-ipratropium (DUO-NEB) 2.5 mg-0.5 mg/3 mL nebulizer solution, Take 3 mLs by nebulization every 6 (six) hours as needed for Wheezing. Rescue, Disp: 75 mL, Rfl: 2    ascorbic acid, vitamin C, (VITAMIN C) 1000 MG tablet, Take 1,000 mg by mouth once daily., Disp: , Rfl:     cloNIDine (CATAPRES) 0.2 MG tablet, Take 1 tablet (0.2 mg total) by mouth every evening., Disp: 30  tablet, Rfl: 6    empagliflozin (JARDIANCE) 10 mg tablet, Take 1 tablet (10 mg total) by mouth once daily., Disp: 90 tablet, Rfl: 3    furosemide (LASIX) 40 MG tablet, Take 1 tablet (40 mg total) by mouth once daily., Disp: 90 tablet, Rfl: 3    meclizine (ANTIVERT) 25 mg tablet, Take 1 tablet (25 mg total) by mouth 3 (three) times daily as needed for Dizziness., Disp: 30 tablet, Rfl: 4    metoprolol succinate (TOPROL-XL) 50 MG 24 hr tablet, Take 1 tablet (50 mg total) by mouth once daily., Disp: 90 tablet, Rfl: 1    nitroGLYCERIN (NITROSTAT) 0.4 MG SL tablet, Place 1 tablet (0.4 mg total) under the tongue every 5 (five) minutes as needed for Chest pain., Disp: 30 tablet, Rfl: 1    omeprazole (PRILOSEC) 40 MG capsule, Take 1 capsule (40 mg total) by mouth once daily., Disp: 90 capsule, Rfl: 1    ondansetron (ZOFRAN) 4 MG tablet, Take 1 tablet (4 mg total) by mouth every 12 (twelve) hours as needed for Nausea., Disp: 20 tablet, Rfl: 0    sacubitriL-valsartan (ENTRESTO) 49-51 mg per tablet, Take 1 tablet by mouth 2 (two) times daily., Disp: 180 tablet, Rfl: 3    spironolactone (ALDACTONE) 25 MG tablet, Take 1 tablet (25 mg total) by mouth once daily., Disp: 30 tablet, Rfl: 11    valACYclovir (VALTREX) 500 MG tablet, Take 1 tablet (500 mg total) by mouth once daily., Disp: 90 tablet, Rfl: 1    HYDROcodone-acetaminophen (NORCO) 7.5-325 mg per tablet, TAKE 1 TABLET BY MOUTH EVERY 4 HOURS AS NEEDED FOR UP TO 7 DAYS. (Patient not taking: Reported on 11/26/2024), Disp: , Rfl:     ketorolac (TORADOL) 10 mg tablet, Take 10 mg by mouth every 12 (twelve) hours as needed. (Patient not taking: Reported on 1/16/2025), Disp: , Rfl:     methocarbamoL (ROBAXIN) 500 MG Tab, Take 2 tablets (1,000 mg total) by mouth 3 (three) times daily as needed (pain). (Patient not taking: Reported on 11/26/2024), Disp: 30 tablet, Rfl: 0    polyethylene glycol (MOVIPREP) 100-7.5-2.691 gram solution, Take as directed prior to colonoscopy (Patient not  "taking: Reported on 10/21/2024), Disp: 1 kit, Rfl: 0    ROS:                                                                                                                        Review of Systems   Constitutional:  Negative for chills and fever.   Respiratory:  Positive for shortness of breath. Negative for cough.    Cardiovascular:  Positive for leg swelling. Negative for chest pain and palpitations.   Gastrointestinal:  Negative for abdominal pain.   Genitourinary:  Negative for dysuria.     Objective:     PE:  Blood pressure (!) 145/85, pulse 85, temperature 98.1 °F (36.7 °C), temperature source Oral, resp. rate 20, height 5' 2" (1.575 m), weight 127.5 kg (281 lb), SpO2 100%.   BP Readings from Last 3 Encounters:   01/16/25 (!) 145/85   12/03/24 138/80   11/26/24 (!) 147/83       Wt Readings from Last 3 Encounters:   01/16/25 127.5 kg (281 lb)   12/03/24 126.6 kg (279 lb)   11/26/24 125.6 kg (276 lb 12.8 oz)     BMI 51.4 kg/m^2    Physical Exam  Constitutional:       Appearance: Normal appearance. She is obese.   HENT:      Head: Normocephalic and atraumatic.   Cardiovascular:      Rate and Rhythm: Normal rate and regular rhythm.      Pulses: Normal pulses.      Heart sounds: No murmur heard.  Pulmonary:      Effort: No respiratory distress.      Breath sounds: Normal breath sounds. No wheezing.   Abdominal:      Palpations: Abdomen is soft.      Tenderness: There is no abdominal tenderness.   Musculoskeletal:      Right lower leg: No edema.      Left lower leg: No edema.   Neurological:      General: No focal deficit present.      Mental Status: She is alert and oriented to person, place, and time.                                                                                                                                                                                                                                                                                                                               " "                                                                                                                                CARDIAC TESTING:  EKG  No results found for this or any previous visit.  Echocardiogram  No results found for this or any previous visit.    Stress Test  No results found for this or any previous visit.     Coronary Angiogram  No results found for this or any previous visit.      Last BMP  BMP  Lab Results   Component Value Date     11/17/2024    K 3.7 11/17/2024     (H) 11/17/2024    CO2 26 11/17/2024    BUN 19.6 11/17/2024    CREATININE 0.84 11/17/2024    CALCIUM 9.5 11/17/2024    ANIONGAP 9 11/06/2024    EGFRNORACEVR >60 11/17/2024   Last CBC     Lab Results   Component Value Date    WBC 7.85 11/17/2024    HGB 11.8 (L) 11/17/2024    HCT 36.9 (L) 11/17/2024    MCV 74.5 (L) 11/17/2024     11/17/2024   Last lipids    Lab Results   Component Value Date    CHOL 169 11/15/2024    CHOL 175 08/20/2024    CHOL 166 04/12/2024     Lab Results   Component Value Date    HDL 47 11/15/2024    HDL 46 08/20/2024    HDL 45 04/12/2024     No results found for: "LDLCALC"  Lab Results   Component Value Date    TRIG 90 11/15/2024    TRIG 91 08/20/2024    TRIG 64 04/12/2024     No results found for: "CHOLHDL"    Assessment:     There are no diagnoses linked to this encounter.    Body mass index is 51.4 kg/m².      Last PCP visit:  12/3/2024    Plan:     ACC/AHA stage C heart failure with reduced ejection fraction  NICM  HTN  NYHA class 2  Prediabetes   NYHA class 2-3 with LVEF 25-30% on recent echocardiogram with persistent dyspnea on exertion.  Prior coronary angiograms without evidence of obstructive CAD .  Repeat angio 7/2024 showed normal coronaries.  -entresto increased the dose today  -Continue metoprolol succinate 50 mg p.o. daily  -continue jardiance and aldactone   -nurse visit for BP check  -repeat BMP prior to nurse visit   - heart healthy dit advised   - lasix as needed "         Future Appointments   Date Time Provider Department Center   1/16/2025  2:00 PM Pankaj Singh MD [facility] CARD [place] [facility]   1/21/2025  7:00 AM Manisha Guillermo FNP [facility]   8/4/2025  9:30 AM PROVIDER, [facility] PULMONOLOGY [facility]      Follow up in cardiology clinic in 4 months     RASHID XIAO MD  Internal Medicine - PGY-1

## 2025-01-16 NOTE — PROGRESS NOTES
"Cardiology Attending  01/16/2025 5:19 PM    I evaluated Saeid Cheema in Cardiology Clinic and discussed the patient's symptoms, findings, and management plan with the resident.   Ms. Saeid Cheema is a 54 y.o. female.  The patient is seen in cardiology clinic for HFrEF, NICM, HTN.  Blood pressure (!) 160/80, pulse 85, temperature 98.1 °F (36.7 °C), temperature source Oral, resp. rate 20, height 5' 2" (1.575 m), weight 127.5 kg (281 lb), SpO2 100%.  The patient is in no apparent distress.      Echo 05/15/2024  EF 25-30%    Cardiac catheterization 07/29/2024  Normal coronaries  LVEDP is 12 mmHg    Lab Results   Component Value Date    HGB 11.8 (L) 11/17/2024      Lab Results   Component Value Date    CREATININE 0.71 01/16/2025      Lab Results   Component Value Date    BNP 51.2 11/17/2024          The plan is to  increase dose of Entresto  Check BMP to evaluate creatinine after Entresto doses increased    FOLLOW UP:  4 months    Pankaj Singh MD   "

## 2025-01-21 ENCOUNTER — OFFICE VISIT (OUTPATIENT)
Dept: INTERNAL MEDICINE | Facility: CLINIC | Age: 55
End: 2025-01-21
Payer: COMMERCIAL

## 2025-01-21 DIAGNOSIS — R21 RASH: ICD-10-CM

## 2025-01-21 DIAGNOSIS — I10 PRIMARY HYPERTENSION: ICD-10-CM

## 2025-01-21 DIAGNOSIS — E55.9 VITAMIN D DEFICIENCY: ICD-10-CM

## 2025-01-21 DIAGNOSIS — R73.03 PREDIABETES: ICD-10-CM

## 2025-01-21 DIAGNOSIS — I50.20 ACC/AHA STAGE C HEART FAILURE WITH REDUCED EJECTION FRACTION: Primary | ICD-10-CM

## 2025-01-21 DIAGNOSIS — R06.02 SOB (SHORTNESS OF BREATH): ICD-10-CM

## 2025-01-21 RX ORDER — OMEPRAZOLE 40 MG/1
40 CAPSULE, DELAYED RELEASE ORAL DAILY
Qty: 90 CAPSULE | Refills: 1 | Status: SHIPPED | OUTPATIENT
Start: 2025-01-21

## 2025-01-21 RX ORDER — VALACYCLOVIR HYDROCHLORIDE 500 MG/1
500 TABLET, FILM COATED ORAL DAILY
Qty: 90 TABLET | Refills: 1 | Status: SHIPPED | OUTPATIENT
Start: 2025-01-21

## 2025-01-21 RX ORDER — CLONIDINE HYDROCHLORIDE 0.2 MG/1
0.2 TABLET ORAL NIGHTLY
Qty: 30 TABLET | Refills: 6 | Status: SHIPPED | OUTPATIENT
Start: 2025-01-21 | End: 2026-01-21

## 2025-01-21 RX ORDER — TRIAMCINOLONE ACETONIDE 1 MG/G
OINTMENT TOPICAL 2 TIMES DAILY PRN
Qty: 30 G | Refills: 1 | Status: SHIPPED | OUTPATIENT
Start: 2025-01-21

## 2025-01-21 RX ORDER — ALBUTEROL SULFATE 90 UG/1
2 INHALANT RESPIRATORY (INHALATION) EVERY 6 HOURS PRN
Qty: 8 G | Refills: 1 | Status: SHIPPED | OUTPATIENT
Start: 2025-01-21

## 2025-01-21 NOTE — PROGRESS NOTES
The patient location is: home  The chief complaint leading to consultation is: lab    Visit type: audiovisual    Face to Face time with patient: 40  40 minutes of total time spent on the encounter, which includes face to face time and non-face to face time preparing to see the patient (eg, review of tests), Obtaining and/or reviewing separately obtained history, Documenting clinical information in the electronic or other health record, Independently interpreting results (not separately reported) and communicating results to the patient/family/caregiver, or Care coordination (not separately reported).         Each patient to whom he or she provides medical services by telemedicine is:  (1) informed of the relationship between the physician and patient and the respective role of any other health care provider with respect to management of the patient; and (2) notified that he or she may decline to receive medical services by telemedicine and may withdraw from such care at any time.    Notes: Internal Medicine Clinic  MARY Cunha     Patient Name: Saeid Cheema   : 1970  MRN:[id number]     Chief Complaint     No chief complaint on file.       History of Present Illness     54 year old AAF, presents TM for follow up. Pt is following Cardiology, states Entresto and lasix were recently increased. Not checking BP daily. States takes clonidine prn high readings, stating she can feel when its high. Will repeat BP with cardiology clinic. No acute complaints voiced, pt has returned to work.  Asking for refill on triamcinolone for intermittent eczema type rash.  PMH HTN, HLD, prediabetes, CAD, HFrEF, left breast cancer 10/7/2022, left lower lobe lung nodule, GERD, migraines, genital herpes, IC, hematuria, chlamydia pneumonia 3/2022, BV. Nonsmoker. PMH TIA, and small cerebral aneurysm in . 2021; MR angio head and MRI brain without acute findings. EEG 2021; normal.   Following Dr. Shankar [facility]  NEURO for migraines. He is trying to get her approved for BOTOX injections for migraine prevention. Followed by [facility] GYN and [facility] Urology clinic for further workup of hematuria; cystoscopy 1/2020 displayed Microscopic hematuria, Squamous metaplasia. Status post bilateral mastectomy 11/30/2022 [facility] with the large open wound in the right chest. Seroma in the region of the left lateral chest wall. Wound are at [facility] per Dr. Rowley. [facility] [address], Dr. Herron and [facility].   She is on valacyclovir 500mg daily for genital herpes-suppression; voices 3 outbreaks per yr, in which she doubles her med for 5 days during an outbreak.   PFT 8/10/2022 moderate restriction, no bronchodilator response. Normal lung volumes. Although she is requesting refill on albuterol as she states she feels it helps her breathing.  PFT 10/26/23 WNL  States her  passed away of cardiac complications 4/1/2022. Lives with 15 year old grandson.   Denies CP, SOB, cough, fever, dizziness, weakness, abdominal pain, nausea, vomiting, diarrhea, constipation, depression, SI, HI.  Works at [employer]  Colonoscopy with polyps, Dr. Richardson 2021; Repeat colonoscopy scheduled for 2/27/2025 per Dr. Dover.     She has [facility] intermittent leave paperwork for h/o migraines.     PFT 11-4-2024  Patient struggled with coughing throughout testing.  Albuterol 2.5 mg given HR 77/74, SAT 97%, BBS diminshed  Normal spirometry. No bronchodilator response. Lung volumes indicate mild restriction. Diffusion capacity is mildly  impaired.            Review of Systems     Review of Systems   HENT: Negative.     Eyes: Negative.    Gastrointestinal: Negative.    Endocrine: Negative.    Genitourinary: Negative.    Musculoskeletal: Negative.    Integumentary:  Positive for rash.   Allergic/Immunologic: Negative.    Hematological: Negative.    Psychiatric/Behavioral: Negative.     All other systems reviewed and are negative.        Physical Examination     There were no vitals taken for this visit.     BP Readings from Last 6 Encounters:   01/16/25 (!) 160/80   12/03/24 138/80   11/26/24 (!) 147/83   11/17/24 (!) 166/84   11/17/24 (!) 181/116   11/15/24 (!) 150/80   ]    Wt Readings from Last 6 Encounters:   01/16/25 127.5 kg (281 lb)   12/03/24 126.6 kg (279 lb)   11/26/24 125.6 kg (276 lb 12.8 oz)   11/17/24 129.3 kg (285 lb)   11/17/24 126.9 kg (279 lb 12.2 oz)   11/15/24 126.6 kg (279 lb 3.2 oz)   ]    BMI Readings from Last 3 Encounters:   01/16/25 51.40 kg/m²   12/03/24 51.03 kg/m²   11/26/24 50.63 kg/m²         Physical Exam  Nursing note reviewed.   Constitutional:       Appearance: Normal appearance.   Neurological:      Mental Status: She is alert.   Psychiatric:         Mood and Affect: Mood normal.         Behavior: Behavior normal.         Thought Content: Thought content normal.         Judgment: Judgment normal.          Labs / Imaging     Chemistry:  Lab Results   Component Value Date     01/16/2025     11/06/2024    K 3.7 01/16/2025    K 3.9 11/06/2024    BUN 15.9 01/16/2025    BUN 15 11/06/2024    CREATININE 0.71 01/16/2025    CREATININE 0.59 11/06/2024    EGFRNORACEVR >60 01/16/2025    GLUCOSE 100 01/16/2025    CALCIUM 9.3 01/16/2025    CALCIUM 9.2 11/06/2024    ALKPHOS 102 11/17/2024    LABPROT 7.2 11/17/2024    ALBUMIN 3.4 (L) 11/17/2024    BILIDIR 0.1 07/13/2022    IBILI 0.10 01/25/2022    AST 14 11/17/2024    ALT 14 11/17/2024    MG 1.80 11/17/2024    RPVYBMRV64AV 37 11/15/2024        Lab Results   Component Value Date    HGBA1C 5.9 11/15/2024        Hematology:  Lab Results   Component Value Date    WBC 7.85 11/17/2024    RBC 4.95 11/17/2024    HGB 11.8 (L) 11/17/2024    HCT 36.9 (L) 11/17/2024    MCV 74.5 (L) 11/17/2024    MCH 23.8 (L) 11/17/2024    MCHC 32.0 (L) 11/17/2024    RDW 16.1 11/17/2024     11/17/2024    MPV 11.3 (H) 11/17/2024        Lipid Panel:  Lab Results   Component Value Date     CHOL 169 11/15/2024    HDL 47 11/15/2024    .00 11/15/2024    TRIG 90 11/15/2024    TOTALCHOLEST 4 11/15/2024        Urine:  Lab Results   Component Value Date    APPEARANCEUA Clear 08/20/2024    SGUA 1.029 08/20/2024    PROTEINUA Trace (A) 08/20/2024    KETONESUA Negative 08/20/2024    LEUKOCYTESUR Negative 08/20/2024    RBCUA 0-5 08/20/2024    WBCUA 0-5 08/20/2024    BACTERIA Trace (A) 08/20/2024    SQEPUA Moderate (A) 08/20/2024    HYALINECASTS 0-2 (A) 08/20/2024    CREATRANDUR 222.0 02/19/2018          Assessment       ICD-10-CM ICD-9-CM   1. ACC/AHA stage C heart failure with reduced ejection fraction  I50.20 428.9   2. Primary hypertension  I10 401.9   3. Prediabetes  R73.03 790.29   4. Vitamin D deficiency  E55.9 268.9   5. Rash  R21 782.1   6. SOB (shortness of breath)  R06.02 786.05        Plan     1. Primary hypertension  Med refills. DASH diet: Eat more fruits, vegetables, and low fat dairy foods.  (Less than 2 grams of sodium per day).  Maintain healthy weight with goal BMI <30.   Exercise 30 minutes per day 5 days per week.  Home medications refilled and continued.   Home BP monitoring encouraged with BP parameters given.    - Hemoglobin A1C; Future  - Lipid Panel; Future  - Comprehensive Metabolic Panel; Future  - CBC Auto Differential; Future  - Urinalysis, Reflex to Urine Culture; Future    2. Prediabetes  A1C at f/u  Low carb diet  Regular exercise  - Hemoglobin A1C; Future  - Lipid Panel; Future  - Comprehensive Metabolic Panel; Future  - CBC Auto Differential; Future  - Urinalysis, Reflex to Urine Culture; Future    3. Vitamin D deficiency  Vitamin D level ordered  Continue OTC Vitamin D3 2000 IU daily.   - Vitamin D; Future    4. Rash  Triamcinolone prn    5. ACC/AHA stage C heart failure with reduced ejection fraction (Primary)  Meds per Cardio, Entresto, Aldactone, Lasix, and metoprolol.  Daily weights  keep apt for BP check next month    6. SOB (shortness of breath)    - albuterol  (PROVENTIL/VENTOLIN HFA) 90 mcg/actuation inhaler; Inhale 2 puffs into the lungs every 6 (six) hours as needed for Wheezing. Rescue  Dispense: 8 g; Refill: 1        Current Outpatient Medications   Medication Instructions    albuterol (PROVENTIL/VENTOLIN HFA) 90 mcg/actuation inhaler 2 puffs, Inhalation, Every 6 hours PRN, Rescue    albuterol-ipratropium (DUO-NEB) 2.5 mg-0.5 mg/3 mL nebulizer solution 3 mLs, Nebulization, Every 6 hours PRN, Rescue    ascorbic acid (vitamin C) (VITAMIN C) 1,000 mg, Daily    cloNIDine (CATAPRES) 0.2 mg, Oral, Nightly    empagliflozin (JARDIANCE) 10 mg, Oral, Daily    furosemide (LASIX) 40 mg, Oral, Daily    meclizine (ANTIVERT) 25 mg, Oral, 3 times daily PRN    metoprolol succinate (TOPROL-XL) 50 mg, Oral, Daily    nitroGLYCERIN (NITROSTAT) 0.4 mg, Sublingual, Every 5 min PRN    omeprazole (PRILOSEC) 40 mg, Oral, Daily    ondansetron (ZOFRAN) 4 mg, Oral, Every 12 hours PRN    polyethylene glycol (MOVIPREP) 100-7.5-2.691 gram solution Take as directed prior to colonoscopy    sacubitriL-valsartan (ENTRESTO)  mg per tablet 1 tablet, Oral, 2 times daily    spironolactone (ALDACTONE) 25 mg, Oral, Daily    triamcinolone acetonide 0.1% (KENALOG) 0.1 % ointment Topical (Top), 2 times daily PRN    valACYclovir (VALTREX) 500 mg, Oral, Daily       Orders Placed This Encounter   Procedures    Vitamin D    Hemoglobin A1C    Lipid Panel    Comprehensive Metabolic Panel    CBC Auto Differential    Urinalysis, Reflex to Urine Culture         Future Appointments   Date Time Provider Department Center   2/12/2025  1:00 PM NURSE, [facility] CARDIOLOGY [facility] [place] [facility]   5/23/2025  9:00 AM Pankaj Singh MD [facility] [place] [facility]   8/4/2025  9:30 AM PROVIDER, [facility] PULMONOLOGY [facility]        Labs thoroughly reviewed with patient. Medication refills addressed today.  RTC prn and 3-4 months, with labs 1 week prior to the apt.  COVID 19 precautions given to patient.  Patient voices  understanding of all discharge instructions.      Manisha Guillermo, MARY

## 2025-01-28 ENCOUNTER — TELEPHONE (OUTPATIENT)
Dept: INTERNAL MEDICINE | Facility: CLINIC | Age: 55
End: 2025-01-28
Payer: COMMERCIAL

## 2025-01-28 DIAGNOSIS — I50.9 CONGESTIVE HEART FAILURE, UNSPECIFIED HF CHRONICITY, UNSPECIFIED HEART FAILURE TYPE: Primary | ICD-10-CM

## 2025-01-28 RX ORDER — VALSARTAN 80 MG/1
80 TABLET ORAL DAILY
Qty: 30 TABLET | Refills: 0 | Status: SHIPPED | OUTPATIENT
Start: 2025-01-28 | End: 2025-01-31 | Stop reason: ALTCHOICE

## 2025-02-05 ENCOUNTER — PATIENT MESSAGE (OUTPATIENT)
Dept: ENDOSCOPY | Facility: HOSPITAL | Age: 55
End: 2025-02-05
Payer: COMMERCIAL

## 2025-02-06 ENCOUNTER — DOCUMENTATION ONLY (OUTPATIENT)
Dept: CARDIOLOGY | Facility: HOSPITAL | Age: 55
End: 2025-02-06
Payer: COMMERCIAL

## 2025-02-06 NOTE — PROGRESS NOTES
Cardiology Attending  02/06/2025 10:52 AM    Pre-Op Cardiac Risk Assessment  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    Pelvic pain    ACC/AHA stage C heart failure with reduced ejection fraction    CHF (congestive heart failure)       Surgical History    Past Surgical History:   Procedure Laterality Date    ANGIOGRAM, CORONARY, WITH LEFT HEART CATHETERIZATION N/A 07/29/2024    Procedure: Angiogram, Coronary, with Left Heart Cath;  Surgeon: Pankaj Singh MD;  Location: [facility] CATH LAB;  Service: Cardiology;  Laterality: N/A;    BREAST SURGERY  11/30/2022    COMPUTED TOMOGRAPHY ANGIOGRAM  09/07/2012    MASTECTOMY Bilateral 11/30/2022    PARTIAL HYSTERECTOMY  07/13/2023     The patient had the following CARDIAC TESTING:  Echo:    Results for orders placed during the hospital encounter of 05/15/24    Echo    Interpretation Summary    Left Ventricle: The left ventricle is mildly dilated. Mildly increased wall thickness. There is severely reduced systolic function with a visually estimated ejection fraction of 25 - 30%.    Right Ventricle: Normal right ventricular cavity size. Systolic function is normal.    Left Atrium: Left atrium is mildly dilated.    Aortic Valve: There is mild aortic regurgitation.    Mitral Valve: There is mild to moderate regurgitation.    Tricuspid Valve: There is mild regurgitation.    IVC/SVC: Normal venous pressure at 3 mmHg.    Stress test:  No results found for this or any previous visit.     Coronary angiogram:  Results for orders placed during the hospital encounter of 07/29/24    Cardiac catheterization    Conclusion    The pre-procedure left ventricular end diastolic pressure was 12.    The estimated blood loss was <50  mL.    The coronary arteries were normal..    ASSESSMENT  Normal coronaries  LVEDP is 12 mmHg    RECOMMENDATIONS  Medical management  Risk factor modifications  Activity -- avoid straining with affected limb for one week  Exercise as tolerated  Precautions post D/C -- come to ER for hematoma, unusual pain, erythema, or unusual drainage at access site  Precautions post D/C -- if develop bleeding or hematoma at access site, hold pressure at access site, and come to ER    The procedure log was documented by No documenter listed and verified by Pankaj Singh MD.    Date: 7/29/2024  Time: 5:08 PM       Current Outpatient Medications   Medication Instructions    albuterol (PROVENTIL/VENTOLIN HFA) 90 mcg/actuation inhaler 2 puffs, Inhalation, Every 6 hours PRN, Rescue    albuterol-ipratropium (DUO-NEB) 2.5 mg-0.5 mg/3 mL nebulizer solution 3 mLs, Nebulization, Every 6 hours PRN, Rescue    ascorbic acid (vitamin C) (VITAMIN C) 1,000 mg, Daily    cloNIDine (CATAPRES) 0.2 mg, Oral, Nightly    empagliflozin (JARDIANCE) 10 mg, Oral, Daily    furosemide (LASIX) 40 mg, Oral, Daily    meclizine (ANTIVERT) 25 mg, Oral, 3 times daily PRN    metoprolol succinate (TOPROL-XL) 50 mg, Oral, Daily    nitroGLYCERIN (NITROSTAT) 0.4 mg, Sublingual, Every 5 min PRN    omeprazole (PRILOSEC) 40 mg, Oral, Daily    ondansetron (ZOFRAN) 4 mg, Oral, Every 12 hours PRN    polyethylene glycol (MOVIPREP) 100-7.5-2.691 gram solution Take as directed prior to colonoscopy    sacubitriL-valsartan (ENTRESTO)  mg per tablet 1 tablet, Oral, 2 times daily    spironolactone (ALDACTONE) 25 mg, Oral, Daily    triamcinolone acetonide 0.1% (KENALOG) 0.1 % ointment Topical (Top), 2 times daily PRN    valACYclovir (VALTREX) 500 mg, Oral, Daily     PREOP RISK ASSESSMENT     DATA FOR RCRI:    Denies upcoming interperitoneal or intrathoracic, or suprainguinal vascular surgery  No CAD.  (MI, Abnormal stress test, Angina, Use of NTG, MI on EKG)   Has CHF  No  TIA or CVA.  Is not being treated with insulin.   Creatinine is not > 2 mg/dL   Lab Results   Component Value Date    CREATININE 0.71 01/16/2025    CREATININE 0.84 11/17/2024    CREATININE 0.69 11/15/2024     The patient's RCRI is 1.  Based on this score the patient's 30-day risk of death, MI, or cardiac arrest with surgery is 6.0%.  The patient has low to intermediate risk for surgery.    Recommendations   Continue the metoprolol in the perioperative period.  Please monitor the patient for evidence of volume overload, and manage as appropriate. The patient is at risk for developing heart failure symptoms.        Pankaj Singh MD

## 2025-02-12 ENCOUNTER — TELEPHONE (OUTPATIENT)
Dept: CARDIOLOGY | Facility: HOSPITAL | Age: 55
End: 2025-02-12
Payer: COMMERCIAL

## 2025-02-25 ENCOUNTER — ANESTHESIA EVENT (OUTPATIENT)
Dept: ENDOSCOPY | Facility: HOSPITAL | Age: 55
End: 2025-02-25
Payer: COMMERCIAL

## 2025-02-25 RX ORDER — LIDOCAINE HYDROCHLORIDE 10 MG/ML
1 INJECTION, SOLUTION EPIDURAL; INFILTRATION; INTRACAUDAL; PERINEURAL ONCE
Status: CANCELLED | OUTPATIENT
Start: 2025-02-25 | End: 2025-02-25

## 2025-02-25 RX ORDER — SODIUM CHLORIDE, SODIUM LACTATE, POTASSIUM CHLORIDE, CALCIUM CHLORIDE 600; 310; 30; 20 MG/100ML; MG/100ML; MG/100ML; MG/100ML
INJECTION, SOLUTION INTRAVENOUS CONTINUOUS
Status: CANCELLED | OUTPATIENT
Start: 2025-02-25

## 2025-02-25 NOTE — ANESTHESIA PREPROCEDURE EVALUATION
02/25/2025  Saeid Cheema is a 54 y.o., female with PMHx of morbid obesity, HTN, HLD, HFrEF, asthma, GERD, TIA, h/o breast CA presents for colonoscopy secondary to h/o colon polyps.    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      Review of Systems  Anesthesia Hx:  No problems with previous Anesthesia                Social:  Non-Smoker       Hematology/Oncology:                        --  Cancer in past history:       Breast    left   surgery       Cardiovascular:     Hypertension, well controlled       CHF    hyperlipidemia                               Pulmonary:    Asthma mild                   Renal/:  Renal/ Normal                 Hepatic/GI:     GERD, well controlled                Neurological:  TIA,                                     Endocrine:        Morbid Obesity / BMI > 40    Vitals:    02/13/25 0951 02/27/25 0656 02/27/25 0701   BP:  137/66 137/66   BP Location:  Right arm    Patient Position:  Lying    Pulse:  80    Resp:  20    Temp:  36.8 °C (98.2 °F)   "  TempSrc:  Oral    SpO2:  97%    Weight: 127 kg (280 lb) 124.7 kg (275 lb)    Height: 5' 2" (1.575 m) 5' 4" (1.626 m)         Physical Exam  General: Alert, Cooperative and Well nourished    Airway:  Mallampati: II   Mouth Opening: Normal  TM Distance: Normal  Tongue: Normal  Neck ROM: Normal ROM    Dental:  Intact    Chest/Lungs:  Clear to auscultation, Normal Respiratory Rate    Heart:  Rate: Normal  Rhythm: Regular Rhythm  Sounds: Normal    Lab Results   Component Value Date    WBC 7.85 11/17/2024    HGB 11.8 (L) 11/17/2024    HCT 36.9 (L) 11/17/2024    MCV 74.5 (L) 11/17/2024     11/17/2024       CMP  Sodium   Date Value Ref Range Status   01/16/2025 140 136 - 145 mmol/L Final   11/06/2024 140 136 - 145 mmol/L Final     Potassium   Date Value Ref Range Status   01/16/2025 3.7 3.5 - 5.1 mmol/L Final   11/06/2024 3.9 3.5 - 5.1 mmol/L Final     Chloride   Date Value Ref Range Status   01/16/2025 108 (H) 98 - 107 mmol/L Final   11/06/2024 105 100 - 109 mmol/L Final     CO2   Date Value Ref Range Status   01/16/2025 26 22 - 29 mmol/L Final     Carbon Dioxide   Date Value Ref Range Status   11/06/2024 26 22 - 33 mmol/L Final     Blood Urea Nitrogen   Date Value Ref Range Status   01/16/2025 15.9 9.8 - 20.1 mg/dL Final   11/06/2024 15 5 - 25 mg/dL Final     Creatinine   Date Value Ref Range Status   01/16/2025 0.71 0.55 - 1.02 mg/dL Final   11/06/2024 0.59 0.55 - 1.02 mg/dL Final     Calcium   Date Value Ref Range Status   01/16/2025 9.3 8.4 - 10.2 mg/dL Final   11/06/2024 9.2 8.8 - 10.6 mg/dL Final     Albumin   Date Value Ref Range Status   11/17/2024 3.4 (L) 3.5 - 5.0 g/dL Final     Bilirubin Total   Date Value Ref Range Status   11/17/2024 0.2 <=1.5 mg/dL Final     ALP   Date Value Ref Range Status   11/17/2024 102 40 - 150 unit/L Final     AST   Date Value Ref Range Status   11/17/2024 14 5 - 34 unit/L Final     ALT   Date Value Ref Range Status   11/17/2024 14 0 - 55 unit/L Final     Anion Gap   Date " Value Ref Range Status   11/06/2024 9 8 - 16 mmol/L Final     eGFR   Date Value Ref Range Status   01/16/2025 >60 mL/min/1.73/m2 Final     Lab Results   Component Value Date    HGBA1C 5.9 11/15/2024           CARDS OV 1/16/25    CARDS OV 2/6/25                Anesthesia Plan  Type of Anesthesia, risks & benefits discussed:    Anesthesia Type: Gen Natural Airway  Intra-op Monitoring Plan: Standard ASA Monitors  Post Op Pain Control Plan: IV/PO Opioids PRN  Induction:  IV  Informed Consent: Informed consent signed with the Patient and all parties understand the risks and agree with anesthesia plan.  All questions answered.   ASA Score: 3  Day of Surgery Review of History & Physical: H&P Update referred to the surgeon/provider.    Ready For Surgery From Anesthesia Perspective.     .

## 2025-02-27 ENCOUNTER — HOSPITAL ENCOUNTER (OUTPATIENT)
Facility: HOSPITAL | Age: 55
Discharge: HOME | End: 2025-02-27
Attending: INTERNAL MEDICINE | Admitting: INTERNAL MEDICINE
Payer: COMMERCIAL

## 2025-02-27 ENCOUNTER — ANESTHESIA (OUTPATIENT)
Dept: ENDOSCOPY | Facility: HOSPITAL | Age: 55
End: 2025-02-27
Payer: COMMERCIAL

## 2025-02-27 VITALS
RESPIRATION RATE: 18 BRPM | OXYGEN SATURATION: 99 % | HEIGHT: 64 IN | TEMPERATURE: 98 F | HEART RATE: 75 BPM | BODY MASS INDEX: 46.95 KG/M2 | DIASTOLIC BLOOD PRESSURE: 65 MMHG | SYSTOLIC BLOOD PRESSURE: 113 MMHG | WEIGHT: 275 LBS

## 2025-02-27 DIAGNOSIS — K57.30 DIVERTICULOSIS LARGE INTESTINE W/O PERFORATION OR ABSCESS W/O BLEEDING: Primary | ICD-10-CM

## 2025-02-27 DIAGNOSIS — D12.2 ADENOMATOUS POLYP OF ASCENDING COLON: ICD-10-CM

## 2025-02-27 DIAGNOSIS — D12.8 ADENOMATOUS RECTAL POLYP: ICD-10-CM

## 2025-02-27 DIAGNOSIS — Z86.0100 HX OF COLONIC POLYPS: ICD-10-CM

## 2025-02-27 PROCEDURE — 88305 TISSUE EXAM BY PATHOLOGIST: CPT | Mod: TC | Performed by: INTERNAL MEDICINE

## 2025-02-27 PROCEDURE — 45385 COLONOSCOPY W/LESION REMOVAL: CPT | Mod: 33,,, | Performed by: INTERNAL MEDICINE

## 2025-02-27 PROCEDURE — 37000008 HC ANESTHESIA 1ST 15 MINUTES: Performed by: INTERNAL MEDICINE

## 2025-02-27 PROCEDURE — D9220A PRA ANESTHESIA: Mod: ,,, | Performed by: NURSE ANESTHETIST, CERTIFIED REGISTERED

## 2025-02-27 PROCEDURE — 45385 COLONOSCOPY W/LESION REMOVAL: CPT | Mod: 33 | Performed by: INTERNAL MEDICINE

## 2025-02-27 PROCEDURE — 27201423 OPTIME MED/SURG SUP & DEVICES STERILE SUPPLY: Performed by: INTERNAL MEDICINE

## 2025-02-27 PROCEDURE — 63600175 PHARM REV CODE 636 W HCPCS: Performed by: NURSE ANESTHETIST, CERTIFIED REGISTERED

## 2025-02-27 PROCEDURE — 37000009 HC ANESTHESIA EA ADD 15 MINS: Performed by: INTERNAL MEDICINE

## 2025-02-27 RX ORDER — LIDOCAINE HYDROCHLORIDE 20 MG/ML
INJECTION, SOLUTION EPIDURAL; INFILTRATION; INTRACAUDAL; PERINEURAL
Status: DISCONTINUED | OUTPATIENT
Start: 2025-02-27 | End: 2025-02-27

## 2025-02-27 RX ORDER — PROPOFOL 10 MG/ML
VIAL (ML) INTRAVENOUS
Status: DISCONTINUED | OUTPATIENT
Start: 2025-02-27 | End: 2025-02-27

## 2025-02-27 RX ADMIN — PROPOFOL 120 MG: 10 INJECTION, EMULSION INTRAVENOUS at 07:02

## 2025-02-27 RX ADMIN — PROPOFOL 30 MG: 10 INJECTION, EMULSION INTRAVENOUS at 07:02

## 2025-02-27 RX ADMIN — PROPOFOL 50 MG: 10 INJECTION, EMULSION INTRAVENOUS at 08:02

## 2025-02-27 RX ADMIN — PROPOFOL 30 MG: 10 INJECTION, EMULSION INTRAVENOUS at 08:02

## 2025-02-27 RX ADMIN — PROPOFOL 40 MG: 10 INJECTION, EMULSION INTRAVENOUS at 07:02

## 2025-02-27 RX ADMIN — PROPOFOL 20 MG: 10 INJECTION, EMULSION INTRAVENOUS at 08:02

## 2025-02-27 RX ADMIN — PROPOFOL 20 MG: 10 INJECTION, EMULSION INTRAVENOUS at 07:02

## 2025-02-27 RX ADMIN — LIDOCAINE HYDROCHLORIDE 100 MG: 20 INJECTION, SOLUTION EPIDURAL; INFILTRATION; INTRACAUDAL; PERINEURAL at 07:02

## 2025-02-27 NOTE — PLAN OF CARE
Procedure complete -returned to room -awake alert- denies discomfort-spouse at bedside- patient tolerating coke at this time-call bell in reach

## 2025-02-27 NOTE — H&P
Colonoscopy History and Physical    Patient Name: Saeid Cheema  MRN: [id number]  : 1970  Date of Procedure:  2025  Referring Physician: Adama Dover MD  Primary Physician: Manisha Guillermo, MARY  Procedure Physician: Adama Dover MD     Procedure - Colonoscopy  ASA - per anesthesia  Mallampati - per anesthesia  History of Anesthesia problems - no  Family history Anesthesia problems -  no   Plan of anesthesia - General    Diagnosis: screening for colon cancer  Chief Complaint: Same as above    HPI: Patient is an 54 y.o. female is here for the above.  This is this patient's 1st screening colonoscopy.  She has no significant GI complaints at this time.  She is significantly overweight and has experienced no change in appetite.  For the most part her bowels function normally with no constipation or diarrhea.  Experienced rectal bleeding.  She is unaware of any family history of colon polyps or colon cancer.    Last colonoscopy:  None   Family history:  Negative  Anticoagulation:  No    ROS:  Constitutional: No fevers, chills, No weight loss  CV: No chest pain  Pulm: No cough, No shortness of breath  GI: see HPI    Medical History:   Past Medical History:   Diagnosis Date    Ductal carcinoma in situ (DCIS) of left breast 10/10/2022    GERD (gastroesophageal reflux disease)     Hypertension     Migraines     Vitamin D deficiency          Surgical History:   Past Surgical History:   Procedure Laterality Date    ANGIOGRAM, CORONARY, WITH LEFT HEART CATHETERIZATION N/A 2024    Procedure: Angiogram, Coronary, with Left Heart Cath;  Surgeon: Pankaj Singh MD;  Location: [facility] CATH LAB;  Service: Cardiology;  Laterality: N/A;    BREAST SURGERY  2022    COMPUTED TOMOGRAPHY ANGIOGRAM  2012    MASTECTOMY Bilateral 2022    PARTIAL HYSTERECTOMY  2023       Family History:   Family History   Problem Relation Name Age of Onset    Hypertension Mother Carine Velazquez     Heart  disease Mother Carine Velazquez     Asthma Mother Carine Velazquez     Hypertension Father Pankaj Malhotra     Heart disease Father Pankaj Malhotra     Ovarian cancer Maternal Grandmother      Breast cancer Maternal Grandmother      Breast cancer Paternal Grandmother      Cervical cancer Daughter  32   .    Social History:   Social History     Socioeconomic History    Marital status:     Number of children: 5   Occupational History    Occupation: Employed   Tobacco Use    Smoking status: Never     Passive exposure: Never    Smokeless tobacco: Never   Substance and Sexual Activity    Alcohol use: Not Currently     Comment: Occasionally    Drug use: Never    Sexual activity: Not Currently     Partners: Male     Birth control/protection: Post-menopausal     Social Drivers of Health     Financial Resource Strain: Low Risk  (11/5/2024)    Received from [facility] and Its Subsidiaries and Affiliates    Overall Financial Resource Strain (CARDIA)     Difficulty of Paying Living Expenses: Not hard at all   Recent Concern: Financial Resource Strain - Medium Risk (8/20/2024)    Overall Financial Resource Strain (CARDIA)     Difficulty of Paying Living Expenses: Somewhat hard   Food Insecurity: No Food Insecurity (11/5/2024)    Received from [facility] and Its Subsidiaries and Affiliates    Hunger Vital Sign     Worried About Running Out of Food in the Last Year: Never true     Ran Out of Food in the Last Year: Never true   Recent Concern: Food Insecurity - Food Insecurity Present (8/20/2024)    Hunger Vital Sign     Worried About Running Out of Food in the Last Year: Sometimes true     Ran Out of Food in the Last Year: Sometimes true   Transportation Needs: No Transportation Needs (11/5/2024)    Received from [facility] and Its Subsidiaries and Affiliates    PRAPARE - Transportation     Lack of Transportation  "(Medical): No     Lack of Transportation (Non-Medical): No   Physical Activity: Insufficiently Active (8/20/2024)    Exercise Vital Sign     Days of Exercise per Week: 1 day     Minutes of Exercise per Session: 20 min   Stress: Stress Concern Present (8/20/2024)    [employer] - Occupational Stress Questionnaire     Feeling of Stress : Very much   Housing Stability: Low Risk  (11/5/2024)    Received from [facility] and Its Subsidiaries and Affiliates    Housing Stability Vital Sign     Unable to Pay for Housing in the Last Year: No     Number of Times Moved in the Last Year: 0     Homeless in the Last Year: No   Recent Concern: Housing Stability - High Risk (8/20/2024)    Housing Stability Vital Sign     Unable to Pay for Housing in the Last Year: Yes     Homeless in the Last Year: No       Review of patient's allergies indicates:   Allergen Reactions    Codeine Shortness Of Breath     Pt denies codeine allergy    Shellfish derived Rash     States eats crawfish       Medications:   Prescriptions Prior to Admission[1]      Physical Exam:    Vital Signs:   Vitals:    02/27/25 0701   BP: 137/66   Pulse:    Resp:    Temp:      /66   Pulse 80   Temp 98.2 °F (36.8 °C) (Oral)   Resp 20   Ht 5' 4" (1.626 m)   Wt 124.7 kg (275 lb)   SpO2 97%   Breastfeeding No   BMI 47.20 kg/m²     General:          Well appearing in no acute distress  Lungs: Clear to auscultation bilaterally, respirations unlabored  Heart: Regular rate and rhythm, S1 and S2 normal, no obvious murmurs  Abdomen:         Soft, non-tender, bowel sounds normal, no masses, no organomegaly        Labs:  Lab Results   Component Value Date    WBC 7.85 11/17/2024    HGB 11.8 (L) 11/17/2024    HCT 36.9 (L) 11/17/2024    MCV 74.5 (L) 11/17/2024     11/17/2024     Lab Results   Component Value Date    INR 1.0 07/18/2024    APTT 27.8 07/18/2024     Lab Results   Component Value Date "     01/16/2025    K 3.7 01/16/2025    CO2 26 01/16/2025    BUN 15.9 01/16/2025    CREATININE 0.71 01/16/2025    LABPROT 7.2 11/17/2024    ALBUMIN 3.4 (L) 11/17/2024    BILITOT 0.2 11/17/2024    ALKPHOS 102 11/17/2024    ALT 14 11/17/2024    AST 14 11/17/2024         Assessment and Plan:    History reviewed, vital signs satisfactory, cardiopulmonary status satisfactory.  I have explained the sedation options, risks, benefits, and alternatives of this endoscopic procedure to the patient including but not limited to bleeding, inflammation, infection, perforation, and death.  All questions were answered and the patient consented to proceed with procedure as planned.   The patient is deemed an appropriate candidate for the sedation as planned.      Adama Dover MD, MPH   of Clinical Medicine  Gastroenterology and Hepatology  [facility]    2/27/2025  7:24 AM          [1]   Medications Prior to Admission   Medication Sig Dispense Refill Last Dose/Taking    albuterol (PROVENTIL/VENTOLIN HFA) 90 mcg/actuation inhaler Inhale 2 puffs into the lungs every 6 (six) hours as needed for Wheezing. Rescue 8 g 1 Past Month    ascorbic acid, vitamin C, (VITAMIN C) 1000 MG tablet Take 1,000 mg by mouth once daily.   Past Week    empagliflozin (JARDIANCE) 10 mg tablet Take 1 tablet (10 mg total) by mouth once daily. 90 tablet 3 Past Week    furosemide (LASIX) 40 MG tablet Take 1 tablet (40 mg total) by mouth once daily. 90 tablet 3 2/25/2025    meclizine (ANTIVERT) 25 mg tablet Take 1 tablet (25 mg total) by mouth 3 (three) times daily as needed for Dizziness. 30 tablet 4 Past Week    nitroGLYCERIN (NITROSTAT) 0.4 MG SL tablet Place 1 tablet (0.4 mg total) under the tongue every 5 (five) minutes as needed for Chest pain. 30 tablet 1 Taking As Needed    sacubitriL-valsartan (ENTRESTO)  mg per tablet Take 1 tablet by mouth 2 (two) times daily. 180 tablet 1 2/25/2025     spironolactone (ALDACTONE) 25 MG tablet Take 1 tablet (25 mg total) by mouth once daily. 30 tablet 11 2/25/2025    albuterol-ipratropium (DUO-NEB) 2.5 mg-0.5 mg/3 mL nebulizer solution Take 3 mLs by nebulization every 6 (six) hours as needed for Wheezing. Rescue 75 mL 2 More than a month    cloNIDine (CATAPRES) 0.2 MG tablet Take 1 tablet (0.2 mg total) by mouth every evening. 30 tablet 6 Unknown    metoprolol succinate (TOPROL-XL) 50 MG 24 hr tablet Take 1 tablet (50 mg total) by mouth once daily. 90 tablet 1 More than a month    omeprazole (PRILOSEC) 40 MG capsule Take 1 capsule (40 mg total) by mouth once daily. 90 capsule 1 2/25/2025    ondansetron (ZOFRAN) 4 MG tablet Take 1 tablet (4 mg total) by mouth every 12 (twelve) hours as needed for Nausea. 20 tablet 0 Unknown    polyethylene glycol (MOVIPREP) 100-7.5-2.691 gram solution Take as directed prior to colonoscopy (Patient not taking: Reported on 10/21/2024) 1 kit 0     triamcinolone acetonide 0.1% (KENALOG) 0.1 % ointment Apply topically 2 (two) times daily as needed (rash). 30 g 1 Unknown    valACYclovir (VALTREX) 500 MG tablet Take 1 tablet (500 mg total) by mouth once daily. 90 tablet 1 More than a month

## 2025-02-27 NOTE — PLAN OF CARE
During assessment and intake patient admits noncompliant with Metoprolol. States she stopped taking it because it brings down her blood pressure too much and she becomes symptomatic. Patient also states she has had a double mastectomy and lymph nodes removed in both. States she has staff avoid using the left arm for blood pressures and IV sticks.

## 2025-02-27 NOTE — ANESTHESIA POSTPROCEDURE EVALUATION
Anesthesia Post Evaluation    Patient: Saeid Cheema    Procedure(s) Performed: Procedure(s) (LRB):  COLONOSCOPY, WITH POLYPECTOMY USING SNARE (N/A)    Final Anesthesia Type: general      Patient location during evaluation: GI PACU  Patient participation: Yes- Able to Participate  Level of consciousness: awake and alert  Post-procedure vital signs: reviewed and stable  Pain management: adequate  Airway patency: patent  CECIL mitigation strategies: Preoperative initiation of continuous positive airway pressure (CPAP) or non-invasive positive pressure ventilation (NIPPV) and Intraoperative administration of CPAP, nasopharyngeal airway, or oral appliance during sedation  PONV status at discharge: No PONV  Anesthetic complications: no      Cardiovascular status: hemodynamically stable  Respiratory status: spontaneous ventilation  Hydration status: euvolemic  Follow-up not needed.              Vitals Value Taken Time   /66 02/27/25 07:01   Temp 36.8 °C (98.2 °F) 02/27/25 06:56   Pulse 80 02/27/25 06:56   Resp 20 02/27/25 06:56   SpO2 97 % 02/27/25 06:56         No case tracking events are documented in the log.      Pain/Sol Score: No data recorded

## 2025-02-27 NOTE — PROVATION PATIENT INSTRUCTIONS
Discharge Summary/Instructions after an Endoscopic Procedure  Patient Name: Saeid Cheema  Patient MRN: [id number]  Patient YOB: 1970 Thursday, February 27, 2025  Adama Dover MD  Dear patient,  As a result of recent federal legislation (The Federal Cures Act), you may   receive lab or pathology results from your procedure in your [facility]   account before your physician is able to contact you. Your physician or   their representative will relay the results to you with their   recommendations at their soonest availability.  Thank you,  RESTRICTIONS:  During your procedure today, you received medications for sedation.  These   medications may affect your judgment, balance and coordination.  Therefore,   for 24 hours, you have the following restrictions:   - DO NOT drive a car, operate machinery, make legal/financial decisions,   sign important papers or drink alcohol.    ACTIVITY:  Today: no heavy lifting, straining or running due to procedural   sedation/anesthesia.  The following day: return to full activity including work.  DIET:  Eat and drink normally unless instructed otherwise.     TREATMENT FOR COMMON SIDE EFFECTS:  - Mild abdominal pain, nausea, belching, bloating or excessive gas:  rest,   eat lightly and use a heating pad.  - Sore Throat: treat with throat lozenges and/or gargle with warm salt   water.  - Because air was used during the procedure, expelling large amounts of air   from your rectum or belching is normal.  - If a bowel prep was taken, you may not have a bowel movement for 1-3 days.    This is normal.  SYMPTOMS TO WATCH FOR AND REPORT TO YOUR PHYSICIAN:  1. Abdominal pain or bloating, other than gas cramps.  2. Chest pain.  3. Back pain.  4. Signs of infection such as: chills or fever occurring within 24 hours   after the procedure.  5. Rectal bleeding, which would show as bright red, maroon, or black stools.   (A tablespoon of blood from the rectum is not serious,  especially if   hemorrhoids are present.)  6. Vomiting.  7. Weakness or dizziness.  GO DIRECTLY TO THE NEAREST EMERGENCY ROOM IF YOU HAVE ANY OF THE FOLLOWING:      Difficulty breathing              Chills and/or fever over 101 F   Persistent vomiting and/or vomiting blood   Severe abdominal pain   Severe chest pain   Black, tarry stools   Bleeding- more than one tablespoon   Any other symptom or condition that you feel may need urgent attention  Your doctor recommends these additional instructions:  If any biopsies were taken, your doctors clinic will contact you in 1 to 2   weeks with any results.  Recommendations:  - Discharge patient to home (ambulatory).   - Resume previous diet today.   - Repeat colonoscopy in 5 years for surveillance.   - Continue present medications.  Impressions:  - One 3 mm polyp in the ascending colon, removed with a cold snare.    Resected and retrieved.   - One 3 mm polyp in the mid rectum, removed with a cold snare.  Resected and   retrieved.   - Diverticulosis in the sigmoid colon and in the proximal descending colon.     - The examination was otherwise normal.  For questions, problems or results please call your physician - Adama Dover MD at Work:  [phone].  [facility] , EMERGENCY ROOM PHONE NUMBER: [phone]  IF A COMPLICATION OR EMERGENCY SITUATION ARISES AND YOU ARE UNABLE TO REACH   YOUR PHYSICIAN - GO DIRECTLY TO THE EMERGENCY ROOM.  MD Adama Hatfield MD  2/27/2025 8:26:04 AM  This report has been verified and signed electronically.  Dear patient,  As a result of recent federal legislation (The Federal Cures Act), you may   receive lab or pathology results from your procedure in your [facility]   account before your physician is able to contact you. Your physician or   their representative will relay the results to you with their   recommendations at their soonest availability.  Thank you,  PROVATION

## 2025-02-27 NOTE — DISCHARGE SUMMARY
[facility] - Endoscopy  Discharge Note  Short Stay    Procedure(s) (LRB):  COLONOSCOPY, WITH POLYPECTOMY USING SNARE (N/A)  The procedure of colonoscopy was explained to the patient and consent obtained.  The patient was transferred to the endoscopy suite, general IV anesthesia was provided by anesthesia Services.  Rectal exam was normal.  The Olympus videocolonoscope was introduced per rectum and advanced around the colon to the cecum.  The ileocecal valve and appendiceal orifice were identified and normal as was the cecal mucosa.  In the mid ascending colon to relatively flat small polyps were identified and removed with cold biopsy forceps.  The transverse colon and descending colon were normal.  There were numerous varying size diverticula noted in the sigmoid colon with no evidence of diverticulitis.  There was a small polyp present in the rectum removed with cold snare.  The rectum was otherwise normal including retroflexed view.  The endoscope was withdrawn.  Withdrawal time cecum to rectum 17 minutes.  The procedure was well tolerated and the patient returned to the recovery area for observation.      Discharge plan-the patient can resume a regular diet today and normal activities tomorrow.  If the polyps removed today are adenomatous a follow-up colonoscopy in 5 years is recommended.    OUTCOME: Patient tolerated treatment/procedure well without complication and is now ready for discharge.    DISPOSITION: Home or Self Care    FINAL DIAGNOSIS:  <principal problem not specified>    FOLLOWUP: With primary care provider    DISCHARGE INSTRUCTIONS:    Discharge Procedure Orders   Diet general     Call MD for:  temperature >100.4     Call MD for:  persistent nausea and vomiting     Call MD for:  severe uncontrolled pain     Call MD for:  difficulty breathing, headache or visual disturbances     Activity as tolerated         Clinical Reference Documents Added to Patient Instructions         Document     COLONOSCOPY DISCHARGE INSTRUCTIONS (ENGLISH)            TIME SPENT ON DISCHARGE: 5 minutes

## 2025-02-27 NOTE — TRANSFER OF CARE
"Anesthesia Transfer of Care Note    Patient: Saeid Cheema    Procedure(s) Performed: Procedure(s) (LRB):  COLONOSCOPY, WITH POLYPECTOMY USING SNARE (N/A)    Patient location: GI    Anesthesia Type: general    Post pain: adequate analgesia    Post assessment: no apparent anesthetic complications    Post vital signs: stable    Level of consciousness: sedated    Nausea/Vomiting: no nausea/vomiting    Complications: none    Transfer of care protocol was followed      Last vitals: Visit Vitals  /66   Pulse 80   Temp 36.8 °C (98.2 °F) (Oral)   Resp 20   Ht 5' 4" (1.626 m)   Wt 124.7 kg (275 lb)   SpO2 97%   Breastfeeding No   BMI 47.20 kg/m²     " Surgery 12/14/2020, still pending medical clearance.

## 2025-02-28 LAB
ESTROGEN SERPL-MCNC: NORMAL PG/ML
INSULIN SERPL-ACNC: NORMAL U[IU]/ML
LAB AP CLINICAL INFORMATION: NORMAL
LAB AP GROSS DESCRIPTION: NORMAL
LAB AP REPORT FOOTNOTES: NORMAL
T3RU NFR SERPL: NORMAL %

## 2025-03-03 ENCOUNTER — RESULTS FOLLOW-UP (OUTPATIENT)
Dept: GASTROENTEROLOGY | Facility: HOSPITAL | Age: 55
End: 2025-03-03

## 2025-03-03 NOTE — PROGRESS NOTES
Please let patient know that polyps removed were adenoma polyps.   These types of polyps are not cancer, but they are pre-cancerous (meaning that they can turn into cancers). Removing the polyp removes the risk of it becoming   cancer.  Repeat colonoscopy is recommended in 5 years.

## 2025-04-30 RX ORDER — TRIAMCINOLONE ACETONIDE 1 MG/G
OINTMENT TOPICAL 2 TIMES DAILY PRN
Qty: 30 G | Refills: 1 | Status: SHIPPED | OUTPATIENT
Start: 2025-04-30

## 2025-04-30 RX ORDER — LISINOPRIL 40 MG/1
40 TABLET ORAL
Qty: 90 TABLET | Refills: 1 | OUTPATIENT
Start: 2025-04-30

## 2025-05-07 ENCOUNTER — PATIENT MESSAGE (OUTPATIENT)
Dept: NEUROLOGY | Facility: CLINIC | Age: 55
End: 2025-05-07
Payer: COMMERCIAL

## 2025-05-07 ENCOUNTER — OFFICE VISIT (OUTPATIENT)
Dept: NEUROLOGY | Facility: CLINIC | Age: 55
End: 2025-05-07
Payer: COMMERCIAL

## 2025-05-07 DIAGNOSIS — G43.719 INTRACTABLE CHRONIC MIGRAINE WITHOUT AURA AND WITHOUT STATUS MIGRAINOSUS: Primary | ICD-10-CM

## 2025-05-07 PROCEDURE — 98006 SYNCH AUDIO-VIDEO EST MOD 30: CPT | Mod: 95,,, | Performed by: NURSE PRACTITIONER

## 2025-05-07 RX ORDER — ATOGEPANT 60 MG/1
60 TABLET ORAL DAILY
Qty: 30 TABLET | Refills: 11 | Status: SHIPPED | OUTPATIENT
Start: 2025-05-07

## 2025-05-07 RX ORDER — UBROGEPANT 100 MG/1
100 TABLET ORAL
Qty: 16 TABLET | Refills: 2 | Status: SHIPPED | OUTPATIENT
Start: 2025-05-07

## 2025-05-07 NOTE — PROGRESS NOTES
Subjective:      Patient ID: Saeid Cheema is a 54 y.o. female.    Chief Complaint:  No chief complaint on file.      History of Present Illness  The patient location is: [facility] (at work)  Rachel JIN FNP-C, am located at the [facility].    Visit type: audiovisual    Face to Face time with patient: 10 minutes  30 minutes of total time spent on the encounter, which includes face to face time and non-face to face time preparing to see the patient (eg, review of tests), Obtaining and/or reviewing separately obtained history, Documenting clinical information in the electronic or other health record, Independently interpreting results (not separately reported) and communicating results to the patient/family/caregiver, or Care coordination (not separately reported).         Each patient to whom he or she provides medical services by telemedicine is:  (1) informed of the relationship between the physician and patient and the respective role of any other health care provider with respect to management of the patient; and (2) notified that he or she may decline to receive medical services by telemedicine and may withdraw from such care at any time.      Patient last seen in September of 2023 presents via telemedicine for headache increase. Initially was seen by Dr. Indra Shankar January 11, 2021 for headaches with associated memory loss and balance difficulty prior to and after headaches. MRI brain w/ w/out contrast unremarkable. MRA brain with no conclusive evidence of cerebral aneurysm or no flow limiting stenosis. EEG normal. Patient has taken Topiramate, Gabapentin, Trokendi XR, Qulipta and Amitriptyline for headache in the past. Most recent CTA performed in June of 2024 which was unremarkable for any flow limiting stenosis or aneurysm. Breast CA 2 years. Had to stop some medications.   Reports headaches increased recently. Every day for the last 2 weeks. Has tried motrin  migraine, excedrin migraine, tylenol. Went to Urgent Care and got a steroid shot with no improvement. States headaches are lasting all day. She had a change in work environment recently. Has dizziness with headache. The headache pain makes it difficult for her to work.          Past Medical History:   Diagnosis Date    Ductal carcinoma in situ (DCIS) of left breast 10/10/2022    GERD (gastroesophageal reflux disease)     Hypertension     Migraines     Vitamin D deficiency        Past Surgical History:   Procedure Laterality Date    ANGIOGRAM, CORONARY, WITH LEFT HEART CATHETERIZATION N/A 07/29/2024    Procedure: Angiogram, Coronary, with Left Heart Cath;  Surgeon: Pankaj Singh MD;  Location: [facility] CATH LAB;  Service: Cardiology;  Laterality: N/A;    BREAST SURGERY  11/30/2022    COLONOSCOPY, WITH POLYPECTOMY USING SNARE N/A 2/27/2025    Procedure: COLONOSCOPY, WITH POLYPECTOMY USING SNARE;  Surgeon: Adama Dover MD;  Location: [facility] ENDOSCOPY;  Service: Endoscopy;  Laterality: N/A;    COMPUTED TOMOGRAPHY ANGIOGRAM  09/07/2012    MASTECTOMY Bilateral 11/30/2022    PARTIAL HYSTERECTOMY  07/13/2023       Family History   Problem Relation Name Age of Onset    Hypertension Mother Carine Caroline     Heart disease Mother Carine Velazquez     Asthma Mother Carine Velazquez     Hypertension Father Pankaj Malhotra     Heart disease Father Pankaj Malhotra     Ovarian cancer Maternal Grandmother      Breast cancer Maternal Grandmother      Breast cancer Paternal Grandmother      Cervical cancer Daughter  32       Social History     Socioeconomic History    Marital status:     Number of children: 5   Occupational History    Occupation: Employed   Tobacco Use    Smoking status: Never     Passive exposure: Never    Smokeless tobacco: Never   Substance and Sexual Activity    Alcohol use: Not Currently     Comment: Occasionally    Drug use: Never    Sexual activity: Not Currently     Partners: Male     Birth control/protection:  Post-menopausal     Social Drivers of Health     Financial Resource Strain: Low Risk  (11/5/2024)    [facility]    Overall Financial Resource Strain (CARDIA)     Difficulty of Paying Living Expenses: Not hard at all   Recent Concern: Financial Resource Strain - Medium Risk (8/20/2024)    Overall Financial Resource Strain (CARDIA)     Difficulty of Paying Living Expenses: Somewhat hard   Food Insecurity: No Food Insecurity (11/5/2024)    [facility]    Hunger Vital Sign     Worried About Running Out of Food in the Last Year: Never true     Ran Out of Food in the Last Year: Never true   Recent Concern: Food Insecurity - Food Insecurity Present (8/20/2024)    Hunger Vital Sign     Worried About Running Out of Food in the Last Year: Sometimes true     Ran Out of Food in the Last Year: Sometimes true   Transportation Needs: No Transportation Needs (11/5/2024)    [facility]    PRAPARE - Transportation     Lack of Transportation (Medical): No     Lack of Transportation (Non-Medical): No   Physical Activity: Insufficiently Active (8/20/2024)    Exercise Vital Sign     Days of Exercise per Week: 1 day     Minutes of Exercise per Session: 20 min   Stress: Stress Concern Present (8/20/2024)    [employer] [place] [employer] - Occupational Stress Questionnaire     Feeling of Stress : Very much   Housing Stability: Low Risk  (11/5/2024)    [facility]    Housing Stability Vital Sign     Unable to Pay for Housing in the Last Year: No     Number of Times Moved in the Last Year: 0     Homeless in the Last Year: No   Recent Concern: Housing Stability - High Risk (8/20/2024)     Housing Stability Vital Sign     Unable to Pay for Housing in the Last Year: Yes       Current Medications[1]    Review of patient's allergies indicates:   Allergen Reactions    Codeine Shortness Of Breath     Pt denies codeine allergy    Shellfish derived Rash     States eats crawfish        Review of Systems  12 point ROS performed and negative unless otherwise documented in HPI  Objective:   AAOx3  NAD  (Limited due to telemedicine)    Assessment:     1. Intractable chronic migraine without aura and without status migrainosus        Plan:     Will start Qulipta 60 mg daily; if any issues with her pharmacy will send to [facility] retail pharmacy  Ubrelvy 100 mg Prn migraine  Will follow up in 3 months to evaluate medications.  Will not restart Topiramate because it was ineffective even with increased doses.             [1]   Current Outpatient Medications   Medication Sig Dispense Refill    albuterol (PROVENTIL/VENTOLIN HFA) 90 mcg/actuation inhaler Inhale 2 puffs into the lungs every 6 (six) hours as needed for Wheezing. Rescue 8 g 1    albuterol-ipratropium (DUO-NEB) 2.5 mg-0.5 mg/3 mL nebulizer solution Take 3 mLs by nebulization every 6 (six) hours as needed for Wheezing. Rescue 75 mL 2    ascorbic acid, vitamin C, (VITAMIN C) 1000 MG tablet Take 1,000 mg by mouth once daily.      atogepant (QULIPTA) 60 mg Tab Take 1 tablet (60 mg total) by mouth once daily. 30 tablet 11    cloNIDine (CATAPRES) 0.2 MG tablet Take 1 tablet (0.2 mg total) by mouth every evening. 30 tablet 6    empagliflozin (JARDIANCE) 10 mg tablet Take 1 tablet (10 mg total) by mouth once daily. 90 tablet 3    furosemide (LASIX) 40 MG tablet Take 1 tablet (40 mg total) by mouth once daily. 90 tablet 3    meclizine (ANTIVERT) 25 mg tablet Take 1 tablet (25 mg total) by mouth 3 (three) times daily as needed for Dizziness. 30 tablet 4    metoprolol succinate (TOPROL-XL) 50 MG 24 hr tablet Take 1 tablet (50 mg total) by mouth once daily. 90 tablet 1     nitroGLYCERIN (NITROSTAT) 0.4 MG SL tablet Place 1 tablet (0.4 mg total) under the tongue every 5 (five) minutes as needed for Chest pain. 30 tablet 1    omeprazole (PRILOSEC) 40 MG capsule Take 1 capsule (40 mg total) by mouth once daily. 90 capsule 1    ondansetron (ZOFRAN) 4 MG tablet Take 1 tablet (4 mg total) by mouth every 12 (twelve) hours as needed for Nausea. 20 tablet 0    sacubitriL-valsartan (ENTRESTO)  mg per tablet Take 1 tablet by mouth 2 (two) times daily. 180 tablet 1    spironolactone (ALDACTONE) 25 MG tablet Take 1 tablet (25 mg total) by mouth once daily. 30 tablet 11    triamcinolone acetonide 0.1% (KENALOG) 0.1 % ointment Apply topically 2 (two) times daily as needed (rash). 30 g 1    ubrogepant (UBRELVY) 100 mg tablet Take 1 tablet (100 mg total) by mouth as needed for Migraine. If symptoms persist or return, may repeat dose after 2 hours. Maximum: 200 mg per 24 hours 16 tablet 2    valACYclovir (VALTREX) 500 MG tablet Take 1 tablet (500 mg total) by mouth once daily. 90 tablet 1     No current facility-administered medications for this visit.

## (undated) DEVICE — SNARE EXACTO COLD

## (undated) DEVICE — MANIFOLD 4 PORT

## (undated) DEVICE — KIT SURGICAL COLON .25 1.1OZ